# Patient Record
Sex: FEMALE | Race: WHITE | NOT HISPANIC OR LATINO | Employment: UNEMPLOYED | URBAN - METROPOLITAN AREA
[De-identification: names, ages, dates, MRNs, and addresses within clinical notes are randomized per-mention and may not be internally consistent; named-entity substitution may affect disease eponyms.]

---

## 2018-03-13 ENCOUNTER — HOSPITAL ENCOUNTER (OUTPATIENT)
Dept: FAMILY MEDICINE CLINIC | Facility: CLINIC | Age: 28
Setting detail: SPECIMEN
Discharge: HOME | End: 2018-03-13
Attending: HOSPITALIST | Admitting: HOSPITALIST

## 2018-07-27 ENCOUNTER — HOSPITAL ENCOUNTER (OUTPATIENT)
Dept: FAMILY MEDICINE CLINIC | Facility: CLINIC | Age: 28
Setting detail: SPECIMEN
Discharge: HOME | End: 2018-07-27
Attending: HOSPITALIST | Admitting: HOSPITALIST

## 2019-08-25 RX ORDER — AMOXICILLIN AND CLAVULANATE POTASSIUM 875; 125 MG/1; MG/1
1 TABLET, FILM COATED ORAL 2 TIMES DAILY
Qty: 20 TABLET | Refills: 0 | Status: SHIPPED | OUTPATIENT
Start: 2019-08-25 | End: 2019-09-04

## 2023-03-28 ENCOUNTER — TELEPHONE (OUTPATIENT)
Dept: ONCOLOGY | Facility: CLINIC | Age: 33
End: 2023-03-28
Payer: COMMERCIAL

## 2023-04-17 PROBLEM — B00.9 HERPESVIRUS INFECTION: Status: ACTIVE | Noted: 2020-12-24

## 2023-04-17 PROBLEM — F41.9 ANXIETY: Status: ACTIVE | Noted: 2021-09-17

## 2023-04-17 PROBLEM — I82.409 DEEP VENOUS THROMBOSIS: Status: ACTIVE | Noted: 2020-08-13

## 2023-04-17 PROBLEM — J02.9 PHARYNGITIS: Status: ACTIVE | Noted: 2018-03-13

## 2023-04-17 PROBLEM — M19.172 POST-TRAUMATIC ARTHRITIS OF LEFT ANKLE: Status: ACTIVE | Noted: 2019-06-07

## 2023-04-17 PROBLEM — E66.9 OBESITY: Status: ACTIVE | Noted: 2020-12-24

## 2023-04-17 PROBLEM — E03.9 HYPOTHYROIDISM: Status: ACTIVE | Noted: 2018-07-27

## 2023-04-17 PROBLEM — D68.2 FACTOR II DEFICIENCY: Status: ACTIVE | Noted: 2020-12-17

## 2023-04-17 RX ORDER — ADHESIVE TAPE 3"X 2.3 YD
TAPE, NON-MEDICATED TOPICAL
COMMUNITY

## 2023-04-17 RX ORDER — LANOLIN ALCOHOL/MO/W.PET/CERES
CREAM (GRAM) TOPICAL
COMMUNITY

## 2023-04-17 RX ORDER — ENOXAPARIN SODIUM 100 MG/ML
INJECTION SUBCUTANEOUS SEE ADMIN INSTRUCTIONS
COMMUNITY
Start: 2020-10-28

## 2023-04-17 RX ORDER — VALACYCLOVIR HYDROCHLORIDE 500 MG/1
500 TABLET, FILM COATED ORAL DAILY
COMMUNITY

## 2023-04-17 RX ORDER — ONDANSETRON 4 MG/1
1 TABLET, ORALLY DISINTEGRATING ORAL 3 TIMES DAILY
COMMUNITY
Start: 2023-02-03

## 2023-04-17 NOTE — PROGRESS NOTES
Hematology/Oncology Outpatient Consultation    Patient name: Nathalie Panda  : 1990  MRN: [id number]  Primary Care Physician: Suhail Issa MD  Referring Physician: Daisy Bansal, *  Reason For Consult:     Chief Complaint   Patient presents with   • Consult     New Pt, Nimisha, Personal history of other venous thrombosis and embolism - Other diseases of the blood and blood-forming organs and certain disorders involving the immune mechanism complicating pregnancy, first trimeste - Other specified coagulation defects       History of Present Illness:    This is a 34-year-old female who has a history of heterozygote mutation for prothrombin gene as well as heterozygote mutation for factor V Leiden.  In  patient developed right lower extremity deep venous thromboses following surgery.  Reviewed  Review of her labs show that she did have factor V heterozygote mutation identified on her thrombophilia work-up.  She was also found to have a heterozygote mutation for prothrombin gene as well and a positive lupus anticoagulants.  She had anticardiolipin antibodies which were negative Antithrombin III activity was normal and protein C activity was normal at 92%.        She is currently pregnant at 17 weeks gestation.  She is established with maternal-fetal medicine.  She is G2, P1.  With her first pregnancy, patient received prophylactic Lovenox 40 mg twice a day.  She is established with [facility] in [place] and has been recommended to receive Lovenox twice a day which she is actively receiving.    She had preeclampsia with her first pregnancy.  So far this pregnancy has been uneventful.  She denies any bleeding issues    Past Medical History:   Diagnosis Date   • Pregnant        Past Surgical History:   Procedure Laterality Date   • ANKLE SURGERY     • ANKLE SURGERY           Current Outpatient Medications:   •  Enoxaparin Sodium (LOVENOX) 40 MG/0.4ML solution prefilled syringe syringe, Inject  as  directed See Admin Instructions., Disp: , Rfl:   •  Prenatal Vit-Fe Fumarate-FA (PRENATAL 1+1 PO), Take  by mouth., Disp: , Rfl:   •  valACYclovir (VALTREX) 500 MG tablet, Take 1 tablet by mouth Daily., Disp: , Rfl:   •  Calcium 500-2.5 MG-MCG chewable tablet, Calcium 500 (Patient not taking: Reported on 4/19/2023), Disp: , Rfl:   •  Magnesium Oxide 200 MG tablet, magnesium 200 mg (as magnesium oxide) tablet (Patient not taking: Reported on 4/19/2023), Disp: , Rfl:   •  metFORMIN (GLUCOPHAGE) 500 MG tablet, metformin 500 mg tablet (Patient not taking: Reported on 4/19/2023), Disp: , Rfl:   •  Multiple Vitamins-Minerals (MULTIVITAMIN ADULT EXTRA C PO), multivitamin (Patient not taking: Reported on 4/19/2023), Disp: , Rfl:   •  ondansetron ODT (ZOFRAN-ODT) 4 MG disintegrating tablet, Take 1 tablet by mouth 3 (Three) Times a Day. (Patient not taking: Reported on 4/19/2023), Disp: , Rfl:   •  Progesterone 200 MG suppository, , Disp: , Rfl:   •  rivaroxaban (XARELTO) 20 MG tablet, Xarelto 20 mg tablet  TK 1 T PO QD (Patient not taking: Reported on 4/19/2023), Disp: , Rfl:     Allergies   Allergen Reactions   • Aspirin Unknown - Low Severity     Reports she is not to take these due to her h/o Factor II and Factor V   • Nsaids Unknown - Low Severity     Reports she is not to take these due to her h/o Factor II and Factor V       Immunization History   Administered Date(s) Administered   • Hep B, Unspecified 12/24/2020       Family History   Problem Relation Age of Onset   • Skin cancer Maternal Aunt    • Skin cancer Maternal Uncle        Cancer-related family history includes Skin cancer in her maternal aunt and maternal uncle.    Social History     Tobacco Use   • Smoking status: Never   • Smokeless tobacco: Never   Vaping Use   • Vaping Use: Never used   Substance Use Topics   • Alcohol use: Never   • Drug use: Never       ROS:    Review of Systems   Constitutional: Negative for chills, fatigue and fever.   HENT:  "Negative for congestion, drooling, ear discharge, rhinorrhea, sinus pressure and tinnitus.    Eyes: Negative for photophobia, pain and discharge.   Respiratory: Negative for apnea, choking and stridor.    Cardiovascular: Negative for palpitations.   Gastrointestinal: Negative for abdominal distention, abdominal pain and anal bleeding.   Endocrine: Negative for polydipsia and polyphagia.   Genitourinary: Negative for decreased urine volume, flank pain and genital sores.   Musculoskeletal: Negative for gait problem, neck pain and neck stiffness.   Skin: Negative for color change, rash and wound.   Neurological: Negative for tremors, seizures, syncope, facial asymmetry and speech difficulty.   Hematological: Negative for adenopathy.   Psychiatric/Behavioral: Negative for agitation, confusion, hallucinations and self-injury. The patient is not hyperactive.        Objective:    Vitals:    04/19/23 1536   BP: 126/80   Pulse: 88   SpO2: 99%   Weight: 99.6 kg (219 lb 9.6 oz)   Height: 170.2 cm (67\")   PainSc: 0-No pain     Body mass index is 34.39 kg/m².    ECOG  (0) Fully active, able to carry on all predisease performance without restriction    Physical Exam:  Physical Exam  Vitals and nursing note reviewed.   Constitutional:       General: She is not in acute distress.     Appearance: She is not diaphoretic.   HENT:      Head: Normocephalic and atraumatic.   Eyes:      General: No scleral icterus.        Right eye: No discharge.         Left eye: No discharge.      Conjunctiva/sclera: Conjunctivae normal.   Neck:      Thyroid: No thyromegaly.   Cardiovascular:      Rate and Rhythm: Normal rate and regular rhythm.      Heart sounds: Normal heart sounds.     No friction rub. No gallop.   Pulmonary:      Effort: Pulmonary effort is normal. No respiratory distress.      Breath sounds: No stridor. No wheezing.   Abdominal:      General: Bowel sounds are normal.      Palpations: Abdomen is soft. There is no mass.      " Tenderness: There is no abdominal tenderness. There is no guarding or rebound.   Musculoskeletal:         General: No tenderness. Normal range of motion.      Cervical back: Normal range of motion and neck supple.   Lymphadenopathy:      Cervical: No cervical adenopathy.   Skin:     General: Skin is warm.      Findings: No erythema or rash.   Neurological:      Mental Status: She is alert and oriented to person, place, and time.      Motor: No abnormal muscle tone.   Psychiatric:         Behavior: Behavior normal.         RECENT LABS  WBC   Date Value Ref Range Status   04/19/2023 11.48 (H) 3.40 - 10.80 10*3/mm3 Final     RBC   Date Value Ref Range Status   04/19/2023 4.34 3.77 - 5.28 10*6/mm3 Final     Hemoglobin   Date Value Ref Range Status   04/19/2023 13.0 12.0 - 15.9 g/dL Final     Hematocrit   Date Value Ref Range Status   04/19/2023 38.0 34.0 - 46.6 % Final     MCV   Date Value Ref Range Status   04/19/2023 87.6 79.0 - 97.0 fL Final     MCH   Date Value Ref Range Status   04/19/2023 30.0 26.6 - 33.0 pg Final     MCHC   Date Value Ref Range Status   04/19/2023 34.2 31.5 - 35.7 g/dL Final     RDW   Date Value Ref Range Status   04/19/2023 13.8 12.3 - 15.4 % Final     RDW-SD   Date Value Ref Range Status   04/19/2023 43.1 37.0 - 54.0 fl Final     MPV   Date Value Ref Range Status   04/19/2023 12.2 (H) 6.0 - 12.0 fL Final     Platelets   Date Value Ref Range Status   04/19/2023 167 140 - 450 10*3/mm3 Final     Neutrophil %   Date Value Ref Range Status   04/19/2023 67.8 42.7 - 76.0 % Final     Lymphocyte %   Date Value Ref Range Status   04/19/2023 25.8 19.6 - 45.3 % Final     Monocyte %   Date Value Ref Range Status   04/19/2023 5.5 5.0 - 12.0 % Final     Eosinophil %   Date Value Ref Range Status   04/19/2023 0.7 0.3 - 6.2 % Final     Basophil %   Date Value Ref Range Status   04/19/2023 0.2 0.0 - 1.5 % Final     Neutrophils, Absolute   Date Value Ref Range Status   04/19/2023 7.79 (H) 1.70 - 7.00 10*3/mm3  Final     Lymphocytes, Absolute   Date Value Ref Range Status   2023 2.96 0.70 - 3.10 10*3/mm3 Final     Monocytes, Absolute   Date Value Ref Range Status   2023 0.63 0.10 - 0.90 10*3/mm3 Final     Eosinophils, Absolute   Date Value Ref Range Status   2023 0.08 0.00 - 0.40 10*3/mm3 Final     Basophils, Absolute   Date Value Ref Range Status   2023 0.02 0.00 - 0.20 10*3/mm3 Final       No results found for: GLUCOSE, BUN, CREATININE, EGFRIFNONA, EGFRIFAFRI, BCR, K, CO2, CALCIUM, PROTENTOTREF, ALBUMIN, LABIL2, BILIRUBIN, AST, ALT      Assessment & Plan   Deep vein thrombosis (DVT) of non-extremity vein, unspecified chronicity  - CBC & Differential      1. IUP at 17 weeks  2.   3. Prothrombin gene heterozygous mutation  4. Factor V leidin heterozygous mutation  5. Positive lupus anticoagulant      Plans:    · Continue Lovenox 40 mg SQ every 12  · Patient to start baby aspirin 81 mg p.o. daily per maternal  fetal medicine recommendation  · Follow-up 6 weeks  · All questions answered    Patient verbalized understanding and is in agreement of the above plan.          I spent 45 total minutes, face-to-face, caring for Nathalie today.  90% of this time involved counseling and/or coordination of care as documented within this note.

## 2023-04-19 ENCOUNTER — CONSULT (OUTPATIENT)
Dept: ONCOLOGY | Facility: CLINIC | Age: 33
End: 2023-04-19
Payer: COMMERCIAL

## 2023-04-19 ENCOUNTER — LAB (OUTPATIENT)
Dept: LAB | Facility: HOSPITAL | Age: 33
End: 2023-04-19
Payer: COMMERCIAL

## 2023-04-19 VITALS
WEIGHT: 219.6 LBS | SYSTOLIC BLOOD PRESSURE: 126 MMHG | BODY MASS INDEX: 34.47 KG/M2 | HEART RATE: 88 BPM | DIASTOLIC BLOOD PRESSURE: 80 MMHG | HEIGHT: 67 IN | OXYGEN SATURATION: 99 %

## 2023-04-19 DIAGNOSIS — I82.90 DEEP VEIN THROMBOSIS (DVT) OF NON-EXTREMITY VEIN, UNSPECIFIED CHRONICITY: Primary | ICD-10-CM

## 2023-04-19 DIAGNOSIS — I82.90 DEEP VEIN THROMBOSIS (DVT) OF NON-EXTREMITY VEIN, UNSPECIFIED CHRONICITY: ICD-10-CM

## 2023-04-19 LAB
BASOPHILS # BLD AUTO: 0.02 10*3/MM3 (ref 0–0.2)
BASOPHILS NFR BLD AUTO: 0.2 % (ref 0–1.5)
DEPRECATED RDW RBC AUTO: 43.1 FL (ref 37–54)
EOSINOPHIL # BLD AUTO: 0.08 10*3/MM3 (ref 0–0.4)
EOSINOPHIL NFR BLD AUTO: 0.7 % (ref 0.3–6.2)
ERYTHROCYTE [DISTWIDTH] IN BLOOD BY AUTOMATED COUNT: 13.8 % (ref 12.3–15.4)
HCT VFR BLD AUTO: 38 % (ref 34–46.6)
HGB BLD-MCNC: 13 G/DL (ref 12–15.9)
LYMPHOCYTES # BLD AUTO: 2.96 10*3/MM3 (ref 0.7–3.1)
LYMPHOCYTES NFR BLD AUTO: 25.8 % (ref 19.6–45.3)
MCH RBC QN AUTO: 30 PG (ref 26.6–33)
MCHC RBC AUTO-ENTMCNC: 34.2 G/DL (ref 31.5–35.7)
MCV RBC AUTO: 87.6 FL (ref 79–97)
MONOCYTES # BLD AUTO: 0.63 10*3/MM3 (ref 0.1–0.9)
MONOCYTES NFR BLD AUTO: 5.5 % (ref 5–12)
NEUTROPHILS NFR BLD AUTO: 67.8 % (ref 42.7–76)
NEUTROPHILS NFR BLD AUTO: 7.79 10*3/MM3 (ref 1.7–7)
PLATELET # BLD AUTO: 167 10*3/MM3 (ref 140–450)
PMV BLD AUTO: 12.2 FL (ref 6–12)
RBC # BLD AUTO: 4.34 10*6/MM3 (ref 3.77–5.28)
WBC NRBC COR # BLD: 11.48 10*3/MM3 (ref 3.4–10.8)

## 2023-04-19 PROCEDURE — 85025 COMPLETE CBC W/AUTO DIFF WBC: CPT

## 2023-04-21 ENCOUNTER — PATIENT ROUNDING (BHMG ONLY) (OUTPATIENT)
Dept: ONCOLOGY | Facility: CLINIC | Age: 33
End: 2023-04-21
Payer: COMMERCIAL

## 2023-04-21 NOTE — PROGRESS NOTES
April 21, 2023    Hello, may I speak with Nathalie Panda?    My name is Aditi Allan      I am  with [facility] [address] [place] [address] [phone].    Before we get started may I verify your date of birth? 1990    I am calling to officially welcome you to our practice and ask about your recent visit. Is this a good time to talk? no    Tell me about your visit with us. What things went well?  A My Chart message was sent to the patient.       We're always looking for ways to make our patients' experiences even better. Do you have recommendations on ways we may improve?  no    Overall were you satisfied with your first visit to our practice? yes       I appreciate you taking the time to speak with me today. Is there anything else I can do for you? no      Thank you, and have a great day.

## 2023-05-22 ENCOUNTER — TELEPHONE (OUTPATIENT)
Dept: ENDOCRINOLOGY | Facility: CLINIC | Age: 33
End: 2023-05-22

## 2023-05-22 NOTE — TELEPHONE ENCOUNTER
Caller: Nathalie Panda    Relationship to patient: Self    Best call back number: [phone]  CAN CALL IN THE MORNINGS OR AFTER 2PM.     Patient is needing: PATIENT OBGYN SENT REFERRAL   DR. HANDY [phone]    NEED CALL BACK TO CHECK ON STATUS

## 2023-05-30 NOTE — PROGRESS NOTES
Hematology/Oncology Outpatient Follow Up    PATIENT NAME:Nathalie Panda  :1990  MRN: [id number]  PRIMARY CARE PHYSICIAN: Suhail Issa MD  REFERRING PHYSICIAN: No ref. provider found    Chief Complaint   Patient presents with   • Follow-up     Deep vein thrombosis (DVT) of non-extremity vein        HISTORY OF PRESENT ILLNESS:     This is a 34-year-old female who has a history of heterozygote mutation for prothrombin gene as well as heterozygote mutation for factor V Leiden.  In  patient developed right lower extremity deep venous thromboses following surgery.  Reviewed  Review of her labs show that she did have factor V heterozygote mutation identified on her thrombophilia work-up.  She was also found to have a heterozygote mutation for prothrombin gene as well and a positive lupus anticoagulants.  She had anticardiolipin antibodies which were negative Antithrombin III activity was normal and protein C activity was normal at 92%.          She is currently pregnant at 17 weeks gestation.  She is established with maternal-fetal medicine.  She is G2, P1.  With her first pregnancy, patient received prophylactic Lovenox 40 mg twice a day.  She is established with [facility] in [place] and has been recommended to receive Lovenox twice a day which she is actively receiving.     She had preeclampsia with her first pregnancy.  So far this pregnancy has been uneventful.  She denies any bleeding issues  Past Medical History:   Diagnosis Date   • Pregnant        Past Surgical History:   Procedure Laterality Date   • ANKLE SURGERY     • ANKLE SURGERY           Current Outpatient Medications:   •  aspirin 81 MG EC tablet, Take 1 tablet by mouth Daily., Disp: , Rfl:   •  Enoxaparin Sodium (LOVENOX) 40 MG/0.4ML solution prefilled syringe syringe, Inject  as directed See Admin Instructions., Disp: , Rfl:   •  Prenatal Vit-Fe Fumarate-FA (PRENATAL 1+1 PO), Take  by mouth., Disp: , Rfl:   •  valACYclovir (VALTREX)  "500 MG tablet, Take 1 tablet by mouth Daily., Disp: , Rfl:     Allergies   Allergen Reactions   • Aspirin Unknown - Low Severity     Reports she is not to take these due to her h/o Factor II and Factor V   • Nsaids Unknown - Low Severity     Reports she is not to take these due to her h/o Factor II and Factor V       Family History   Problem Relation Age of Onset   • Skin cancer Maternal Aunt    • Skin cancer Maternal Uncle        Cancer-related family history includes Skin cancer in her maternal aunt and maternal uncle.    Social History     Tobacco Use   • Smoking status: Never   • Smokeless tobacco: Never   Vaping Use   • Vaping Use: Never used   Substance Use Topics   • Alcohol use: Never   • Drug use: Never       I have reviewed and confirmed the accuracy of the patient's history: Chief complaint, HPI, ROS and Subjective as entered by the MA/LPN/RN. Tiff De Dios MD 05/31/23     SUBJECTIVE:      Bruising at sites of injection    REVIEW OF SYSTEMS:  Review of Systems  Abdominal wall bruises  OBJECTIVE:    Vitals:    05/31/23 1139   BP: 116/81   Pulse: 88   Resp: 20   Temp: 98 °F (36.7 °C)   TempSrc: Infrared   SpO2: 99%   Weight: 102 kg (224 lb)   Height: 170.2 cm (67\")   PainSc: 0-No pain     Body mass index is 35.08 kg/m².    ECOG  (0) Fully active, able to carry on all predisease performance without restriction    Physical Exam  Remarkable except for abdominal wall bruises, pregnant uterus    RECENT LABS  WBC   Date Value Ref Range Status   05/31/2023 11.17 (H) 3.40 - 10.80 10*3/mm3 Final     RBC   Date Value Ref Range Status   05/31/2023 4.04 3.77 - 5.28 10*6/mm3 Final     Hemoglobin   Date Value Ref Range Status   05/31/2023 12.2 12.0 - 15.9 g/dL Final     Hematocrit   Date Value Ref Range Status   05/31/2023 36.2 34.0 - 46.6 % Final     MCV   Date Value Ref Range Status   05/31/2023 89.6 79.0 - 97.0 fL Final     MCH   Date Value Ref Range Status   05/31/2023 30.2 26.6 - 33.0 pg Final     MCHC "   Date Value Ref Range Status   2023 33.7 31.5 - 35.7 g/dL Final     RDW   Date Value Ref Range Status   2023 13.5 12.3 - 15.4 % Final     RDW-SD   Date Value Ref Range Status   2023 42.9 37.0 - 54.0 fl Final     MPV   Date Value Ref Range Status   2023 11.3 6.0 - 12.0 fL Final     Platelets   Date Value Ref Range Status   2023 192 140 - 450 10*3/mm3 Final     Neutrophil %   Date Value Ref Range Status   2023 80.2 (H) 42.7 - 76.0 % Final     Lymphocyte %   Date Value Ref Range Status   2023 14.7 (L) 19.6 - 45.3 % Final     Monocyte %   Date Value Ref Range Status   2023 4.4 (L) 5.0 - 12.0 % Final     Eosinophil %   Date Value Ref Range Status   2023 0.5 0.3 - 6.2 % Final     Basophil %   Date Value Ref Range Status   2023 0.2 0.0 - 1.5 % Final     Neutrophils, Absolute   Date Value Ref Range Status   2023 8.96 (H) 1.70 - 7.00 10*3/mm3 Final     Lymphocytes, Absolute   Date Value Ref Range Status   2023 1.64 0.70 - 3.10 10*3/mm3 Final     Monocytes, Absolute   Date Value Ref Range Status   2023 0.49 0.10 - 0.90 10*3/mm3 Final     Eosinophils, Absolute   Date Value Ref Range Status   2023 0.06 0.00 - 0.40 10*3/mm3 Final     Basophils, Absolute   Date Value Ref Range Status   2023 0.02 0.00 - 0.20 10*3/mm3 Final       No results found for: GLUCOSE, BUN, CREATININE, EGFRIFNONA, EGFRIFAFRI, BCR, K, CO2, CALCIUM, PROTENTOTREF, ALBUMIN, LABIL2, BILIRUBIN, AST, ALT      Assessment & Plan     Deep vein thrombosis (DVT) of non-extremity vein, unspecified chronicity  - CBC & Differential      ASSESSMENT:      Deep vein thrombosis (DVT) of non-extremity vein, unspecified chronicity  - CBC & Differential        1. IUP at 23 weeks  2.   3. Abdominal wall bruising secondary to Lovenox.  Patient is currently on 40 mg every 12  4. Prothrombin gene heterozygous mutation  5. Factor V leidin heterozygous mutation  6. Positive lupus  anticoagulant  7. Assessment has been reviewed and updated        Plans:     • Continue Lovenox 40 mg SQ every 12  • Patient on aspirin 81 mg p.o. daily per maternal  fetal medicine recommendation likely contributing to her bruises.  She will follow-up with OB  • Follow-up 4 weeks  • All questions answered     Patient verbalized understanding and is in agreement of the above plan.              I spent 30 total minutes, face-to-face, caring for Nathalie today. 90% of this time involved counseling and/or coordination of care as documented within this note.

## 2023-05-31 ENCOUNTER — OFFICE VISIT (OUTPATIENT)
Dept: ONCOLOGY | Facility: CLINIC | Age: 33
End: 2023-05-31

## 2023-05-31 ENCOUNTER — LAB (OUTPATIENT)
Dept: LAB | Facility: HOSPITAL | Age: 33
End: 2023-05-31

## 2023-05-31 VITALS
DIASTOLIC BLOOD PRESSURE: 81 MMHG | HEIGHT: 67 IN | WEIGHT: 224 LBS | BODY MASS INDEX: 35.16 KG/M2 | HEART RATE: 88 BPM | OXYGEN SATURATION: 99 % | SYSTOLIC BLOOD PRESSURE: 116 MMHG | RESPIRATION RATE: 20 BRPM | TEMPERATURE: 98 F

## 2023-05-31 DIAGNOSIS — I82.90 DEEP VEIN THROMBOSIS (DVT) OF NON-EXTREMITY VEIN, UNSPECIFIED CHRONICITY: Primary | ICD-10-CM

## 2023-05-31 LAB
BASOPHILS # BLD AUTO: 0.02 10*3/MM3 (ref 0–0.2)
BASOPHILS NFR BLD AUTO: 0.2 % (ref 0–1.5)
DEPRECATED RDW RBC AUTO: 42.9 FL (ref 37–54)
EOSINOPHIL # BLD AUTO: 0.06 10*3/MM3 (ref 0–0.4)
EOSINOPHIL NFR BLD AUTO: 0.5 % (ref 0.3–6.2)
ERYTHROCYTE [DISTWIDTH] IN BLOOD BY AUTOMATED COUNT: 13.5 % (ref 12.3–15.4)
HCT VFR BLD AUTO: 36.2 % (ref 34–46.6)
HGB BLD-MCNC: 12.2 G/DL (ref 12–15.9)
HOLD SPECIMEN: NORMAL
HOLD SPECIMEN: NORMAL
LYMPHOCYTES # BLD AUTO: 1.64 10*3/MM3 (ref 0.7–3.1)
LYMPHOCYTES NFR BLD AUTO: 14.7 % (ref 19.6–45.3)
MCH RBC QN AUTO: 30.2 PG (ref 26.6–33)
MCHC RBC AUTO-ENTMCNC: 33.7 G/DL (ref 31.5–35.7)
MCV RBC AUTO: 89.6 FL (ref 79–97)
MONOCYTES # BLD AUTO: 0.49 10*3/MM3 (ref 0.1–0.9)
MONOCYTES NFR BLD AUTO: 4.4 % (ref 5–12)
NEUTROPHILS NFR BLD AUTO: 8.96 10*3/MM3 (ref 1.7–7)
NEUTROPHILS NFR BLD AUTO: 80.2 % (ref 42.7–76)
PLATELET # BLD AUTO: 192 10*3/MM3 (ref 140–450)
PMV BLD AUTO: 11.3 FL (ref 6–12)
RBC # BLD AUTO: 4.04 10*6/MM3 (ref 3.77–5.28)
WBC NRBC COR # BLD: 11.17 10*3/MM3 (ref 3.4–10.8)
WHOLE BLOOD HOLD COAG: NORMAL

## 2023-05-31 PROCEDURE — 36415 COLL VENOUS BLD VENIPUNCTURE: CPT

## 2023-05-31 PROCEDURE — 85025 COMPLETE CBC W/AUTO DIFF WBC: CPT

## 2023-05-31 RX ORDER — ASPIRIN 81 MG/1
81 TABLET ORAL DAILY
COMMUNITY

## 2023-07-25 NOTE — PROGRESS NOTES
Hematology/Oncology Outpatient Follow Up    PATIENT NAME:Nathalie Panda  :1990  MRN: [id number]  PRIMARY CARE PHYSICIAN: Suhail Issa MD  REFERRING PHYSICIAN: No ref. provider found    Chief Complaint   Patient presents with    Follow-up     Deep vein thrombosis (DVT) of non-extremity vein, unspecified chronicity        HISTORY OF PRESENT ILLNESS:     This is a 33-year-old female who has a history of heterozygote mutation for prothrombin gene as well as heterozygote mutation for factor V Leiden.  In  patient developed right lower extremity deep venous thromboses following surgery.  Reviewed  Review of her labs show that she did have factor V heterozygote mutation identified on her thrombophilia work-up.  She was also found to have a heterozygote mutation for prothrombin gene as well and a positive lupus anticoagulants.  She had anticardiolipin antibodies which were negative Antithrombin III activity was normal and protein C activity was normal at 92%.          She is currently pregnant at 17 weeks gestation.  She is established with maternal-fetal medicine.  She is G2, P1.  With her first pregnancy, patient received prophylactic Lovenox 40 mg twice a day.  She is established with [facility] in [place] and has been recommended to receive Lovenox twice a day which she is actively receiving.     She had preeclampsia with her first pregnancy.  So far this pregnancy has been uneventful.  She denies any bleeding issues  Past Medical History:   Diagnosis Date    Pregnant        Past Surgical History:   Procedure Laterality Date    ANKLE SURGERY      ANKLE SURGERY           Current Outpatient Medications:     Blood Glucose Monitoring Suppl (OneTouch Verio Reflect) w/Device kit, See Admin Instructions., Disp: , Rfl:     Lancets (OneTouch Delica Plus [id number]) misc, 4 (Four) Times a Day. as directed, Disp: , Rfl:     OneTouch Verio test strip, by Other route 4 (Four) Times a Day. Use to test blood sugar  "4 times daily, Disp: , Rfl:     aspirin 81 MG EC tablet, Take 1 tablet by mouth Daily., Disp: , Rfl:     Enoxaparin Sodium (LOVENOX) 40 MG/0.4ML solution prefilled syringe syringe, Inject  as directed See Admin Instructions., Disp: , Rfl:     Prenatal Vit-Fe Fumarate-FA (PRENATAL 1+1 PO), Take  by mouth., Disp: , Rfl:     valACYclovir (VALTREX) 500 MG tablet, Take 1 tablet by mouth Daily., Disp: , Rfl:     Allergies   Allergen Reactions    Aspirin Unknown - Low Severity     Reports she is not to take these due to her h/o Factor II and Factor V    Nsaids Unknown - Low Severity     Reports she is not to take these due to her h/o Factor II and Factor V       Family History   Problem Relation Age of Onset    Skin cancer Maternal Aunt     Skin cancer Maternal Uncle        Cancer-related family history includes Skin cancer in her maternal aunt and maternal uncle.    Social History     Tobacco Use    Smoking status: Never    Smokeless tobacco: Never   Vaping Use    Vaping Use: Never used   Substance Use Topics    Alcohol use: Never    Drug use: Never     I have reviewed and confirmed the accuracy of the patient's history: Chief complaint, HPI, ROS, and Subjective as entered by the MA/LPN/RN. Tiff De Dios MD 07/26/23      SUBJECTIVE:      Bruising at sites of injection    Patient denies any new issues    REVIEW OF SYSTEMS:  Review of Systems  Abdominal wall bruises  OBJECTIVE:    Vitals:    07/26/23 1044   BP: 135/84   Pulse: 104   Resp: 20   Temp: 98 °F (36.7 °C)   TempSrc: Infrared   SpO2: 99%   Weight: 101 kg (223 lb)   Height: 170.2 cm (67\")   PainSc:   1   PainLoc: Generalized     Body mass index is 34.93 kg/m².    ECOG  (0) Fully active, able to carry on all predisease performance without restriction    Physical Exam  Remarkable except for abdominal wall bruises, pregnant uterus  I have reexamined the patient and the results are consistent with the previously documented exam. Tiff De Dios MD  "   RECENT LABS  WBC   Date Value Ref Range Status   07/26/2023 9.01 3.40 - 10.80 10*3/mm3 Final     RBC   Date Value Ref Range Status   07/26/2023 3.94 3.77 - 5.28 10*6/mm3 Final     Hemoglobin   Date Value Ref Range Status   07/26/2023 11.6 (L) 12.0 - 15.9 g/dL Final     Hematocrit   Date Value Ref Range Status   07/26/2023 35.1 34.0 - 46.6 % Final     MCV   Date Value Ref Range Status   07/26/2023 89.1 79.0 - 97.0 fL Final     MCH   Date Value Ref Range Status   07/26/2023 29.4 26.6 - 33.0 pg Final     MCHC   Date Value Ref Range Status   07/26/2023 33.0 31.5 - 35.7 g/dL Final     RDW   Date Value Ref Range Status   07/26/2023 14.1 12.3 - 15.4 % Final     RDW-SD   Date Value Ref Range Status   07/26/2023 44.5 37.0 - 54.0 fl Final     MPV   Date Value Ref Range Status   07/26/2023 11.4 6.0 - 12.0 fL Final     Platelets   Date Value Ref Range Status   07/26/2023 150 140 - 450 10*3/mm3 Final     Neutrophil %   Date Value Ref Range Status   07/26/2023 73.9 42.7 - 76.0 % Final     Lymphocyte %   Date Value Ref Range Status   07/26/2023 20.6 19.6 - 45.3 % Final     Monocyte %   Date Value Ref Range Status   07/26/2023 4.6 (L) 5.0 - 12.0 % Final     Eosinophil %   Date Value Ref Range Status   07/26/2023 0.7 0.3 - 6.2 % Final     Basophil %   Date Value Ref Range Status   07/26/2023 0.2 0.0 - 1.5 % Final     Neutrophils, Absolute   Date Value Ref Range Status   07/26/2023 6.66 1.70 - 7.00 10*3/mm3 Final     Lymphocytes, Absolute   Date Value Ref Range Status   07/26/2023 1.86 0.70 - 3.10 10*3/mm3 Final     Monocytes, Absolute   Date Value Ref Range Status   07/26/2023 0.41 0.10 - 0.90 10*3/mm3 Final     Eosinophils, Absolute   Date Value Ref Range Status   07/26/2023 0.06 0.00 - 0.40 10*3/mm3 Final     Basophils, Absolute   Date Value Ref Range Status   07/26/2023 0.02 0.00 - 0.20 10*3/mm3 Final       No results found for: GLUCOSE, BUN, CREATININE, EGFRIFNONA, EGFRIFAFRI, BCR, K, CO2, CALCIUM, PROTENTOTREF, ALBUMIN,  LABIL2, BILIRUBIN, AST, ALT      Assessment & Plan     Deep vein thrombosis (DVT) of non-extremity vein, unspecified chronicity  - CBC & Differential      ASSESSMENT:      Deep vein thrombosis (DVT) of non-extremity vein, unspecified chronicity  - CBC & Differential        IUP at 31 weeks    Mild anemia likely secondary to iron deficiency.  She will continue prenatal vitamins with iron.  Repeat CBC in 2 weeks  Abdominal wall bruising secondary to Lovenox.  Patient is currently on 40 mg every 12.  We will continue the same  Prothrombin gene heterozygous mutation  Factor V leidin heterozygous mutation  Positive lupus anticoagulant  Assessment has been reviewed and updated        Plans:     Continue Lovenox 40 mg SQ every 12.  Reviewed  Patient on aspirin 81 mg p.o. daily per maternal  fetal medicine recommendation likely contributing to her bruises.  She will follow-up with OB.  She will continue  CBC in 2 weeks  Follow-up in 4 weeks  Continue prenatal vitamins with iron  All questions answered     Patient verbalized understanding and is in agreement of the above plan.         I spent 30 total minutes, face-to-face, caring for Nathalie today. 90% of this time involved counseling and/or coordination of care as documented within this note.

## 2023-07-26 ENCOUNTER — LAB (OUTPATIENT)
Dept: LAB | Facility: HOSPITAL | Age: 33
End: 2023-07-26
Payer: COMMERCIAL

## 2023-07-26 ENCOUNTER — OFFICE VISIT (OUTPATIENT)
Dept: ONCOLOGY | Facility: CLINIC | Age: 33
End: 2023-07-26
Payer: COMMERCIAL

## 2023-07-26 VITALS
HEIGHT: 67 IN | HEART RATE: 104 BPM | SYSTOLIC BLOOD PRESSURE: 135 MMHG | TEMPERATURE: 98 F | DIASTOLIC BLOOD PRESSURE: 84 MMHG | RESPIRATION RATE: 20 BRPM | WEIGHT: 223 LBS | BODY MASS INDEX: 35 KG/M2 | OXYGEN SATURATION: 99 %

## 2023-07-26 DIAGNOSIS — I82.90 DEEP VEIN THROMBOSIS (DVT) OF NON-EXTREMITY VEIN, UNSPECIFIED CHRONICITY: Primary | ICD-10-CM

## 2023-07-26 LAB
BASOPHILS # BLD AUTO: 0.02 10*3/MM3 (ref 0–0.2)
BASOPHILS NFR BLD AUTO: 0.2 % (ref 0–1.5)
DEPRECATED RDW RBC AUTO: 44.5 FL (ref 37–54)
EOSINOPHIL # BLD AUTO: 0.06 10*3/MM3 (ref 0–0.4)
EOSINOPHIL NFR BLD AUTO: 0.7 % (ref 0.3–6.2)
ERYTHROCYTE [DISTWIDTH] IN BLOOD BY AUTOMATED COUNT: 14.1 % (ref 12.3–15.4)
HCT VFR BLD AUTO: 35.1 % (ref 34–46.6)
HGB BLD-MCNC: 11.6 G/DL (ref 12–15.9)
HOLD SPECIMEN: NORMAL
HOLD SPECIMEN: NORMAL
LYMPHOCYTES # BLD AUTO: 1.86 10*3/MM3 (ref 0.7–3.1)
LYMPHOCYTES NFR BLD AUTO: 20.6 % (ref 19.6–45.3)
MCH RBC QN AUTO: 29.4 PG (ref 26.6–33)
MCHC RBC AUTO-ENTMCNC: 33 G/DL (ref 31.5–35.7)
MCV RBC AUTO: 89.1 FL (ref 79–97)
MONOCYTES # BLD AUTO: 0.41 10*3/MM3 (ref 0.1–0.9)
MONOCYTES NFR BLD AUTO: 4.6 % (ref 5–12)
NEUTROPHILS NFR BLD AUTO: 6.66 10*3/MM3 (ref 1.7–7)
NEUTROPHILS NFR BLD AUTO: 73.9 % (ref 42.7–76)
PLATELET # BLD AUTO: 150 10*3/MM3 (ref 140–450)
PMV BLD AUTO: 11.4 FL (ref 6–12)
RBC # BLD AUTO: 3.94 10*6/MM3 (ref 3.77–5.28)
WBC NRBC COR # BLD: 9.01 10*3/MM3 (ref 3.4–10.8)
WHOLE BLOOD HOLD COAG: NORMAL

## 2023-07-26 PROCEDURE — 36415 COLL VENOUS BLD VENIPUNCTURE: CPT

## 2023-07-26 PROCEDURE — 85025 COMPLETE CBC W/AUTO DIFF WBC: CPT

## 2023-07-26 RX ORDER — LANCETS 33 GAUGE
EACH MISCELLANEOUS 4 TIMES DAILY
COMMUNITY
Start: 2023-07-20

## 2023-07-26 RX ORDER — BLOOD-GLUCOSE METER
KIT MISCELLANEOUS SEE ADMIN INSTRUCTIONS
COMMUNITY
Start: 2023-06-19

## 2023-07-26 RX ORDER — BLOOD SUGAR DIAGNOSTIC
STRIP MISCELLANEOUS 4 TIMES DAILY
COMMUNITY
Start: 2023-07-20

## 2023-08-03 ENCOUNTER — TELEPHONE (OUTPATIENT)
Dept: ONCOLOGY | Facility: CLINIC | Age: 33
End: 2023-08-03

## 2023-08-03 NOTE — TELEPHONE ENCOUNTER
Caller:  FROM [facility]    Best call back number: [phone]     IS CALLING TO GET PROGRESS NTOE S FROM 5-31-23 AND 7-26-23. FAX -[phone].

## 2023-08-08 ENCOUNTER — LAB (OUTPATIENT)
Dept: LAB | Facility: HOSPITAL | Age: 33
End: 2023-08-08
Payer: COMMERCIAL

## 2023-08-08 DIAGNOSIS — I82.90 DEEP VEIN THROMBOSIS (DVT) OF NON-EXTREMITY VEIN, UNSPECIFIED CHRONICITY: ICD-10-CM

## 2023-08-08 LAB
BASOPHILS # BLD AUTO: 0.02 10*3/MM3 (ref 0–0.2)
BASOPHILS NFR BLD AUTO: 0.2 % (ref 0–1.5)
DEPRECATED RDW RBC AUTO: 44.8 FL (ref 37–54)
EOSINOPHIL # BLD AUTO: 0.04 10*3/MM3 (ref 0–0.4)
EOSINOPHIL NFR BLD AUTO: 0.4 % (ref 0.3–6.2)
ERYTHROCYTE [DISTWIDTH] IN BLOOD BY AUTOMATED COUNT: 14.1 % (ref 12.3–15.4)
HCT VFR BLD AUTO: 34.5 % (ref 34–46.6)
HGB BLD-MCNC: 11.3 G/DL (ref 12–15.9)
LYMPHOCYTES # BLD AUTO: 2.17 10*3/MM3 (ref 0.7–3.1)
LYMPHOCYTES NFR BLD AUTO: 21.7 % (ref 19.6–45.3)
MCH RBC QN AUTO: 29.3 PG (ref 26.6–33)
MCHC RBC AUTO-ENTMCNC: 32.8 G/DL (ref 31.5–35.7)
MCV RBC AUTO: 89.4 FL (ref 79–97)
MONOCYTES # BLD AUTO: 0.51 10*3/MM3 (ref 0.1–0.9)
MONOCYTES NFR BLD AUTO: 5.1 % (ref 5–12)
NEUTROPHILS NFR BLD AUTO: 7.27 10*3/MM3 (ref 1.7–7)
NEUTROPHILS NFR BLD AUTO: 72.6 % (ref 42.7–76)
PLATELET # BLD AUTO: 157 10*3/MM3 (ref 140–450)
PMV BLD AUTO: 10.9 FL (ref 6–12)
RBC # BLD AUTO: 3.86 10*6/MM3 (ref 3.77–5.28)
WBC NRBC COR # BLD: 10.01 10*3/MM3 (ref 3.4–10.8)

## 2023-08-08 PROCEDURE — 85025 COMPLETE CBC W/AUTO DIFF WBC: CPT

## 2023-08-08 PROCEDURE — 36415 COLL VENOUS BLD VENIPUNCTURE: CPT

## 2023-08-23 ENCOUNTER — LAB (OUTPATIENT)
Dept: LAB | Facility: HOSPITAL | Age: 33
End: 2023-08-23
Payer: COMMERCIAL

## 2023-08-23 ENCOUNTER — OFFICE VISIT (OUTPATIENT)
Dept: ONCOLOGY | Facility: CLINIC | Age: 33
End: 2023-08-23
Payer: COMMERCIAL

## 2023-08-23 VITALS
RESPIRATION RATE: 18 BRPM | DIASTOLIC BLOOD PRESSURE: 78 MMHG | WEIGHT: 221 LBS | OXYGEN SATURATION: 98 % | HEIGHT: 67 IN | BODY MASS INDEX: 34.69 KG/M2 | HEART RATE: 60 BPM | TEMPERATURE: 98 F | SYSTOLIC BLOOD PRESSURE: 116 MMHG

## 2023-08-23 DIAGNOSIS — I82.90 DEEP VEIN THROMBOSIS (DVT) OF NON-EXTREMITY VEIN, UNSPECIFIED CHRONICITY: Primary | ICD-10-CM

## 2023-08-23 LAB
BASOPHILS # BLD AUTO: 0.01 10*3/MM3 (ref 0–0.2)
BASOPHILS NFR BLD AUTO: 0.1 % (ref 0–1.5)
DEPRECATED RDW RBC AUTO: 45.6 FL (ref 37–54)
EOSINOPHIL # BLD AUTO: 0.04 10*3/MM3 (ref 0–0.4)
EOSINOPHIL NFR BLD AUTO: 0.3 % (ref 0.3–6.2)
ERYTHROCYTE [DISTWIDTH] IN BLOOD BY AUTOMATED COUNT: 14.2 % (ref 12.3–15.4)
HCT VFR BLD AUTO: 34.2 % (ref 34–46.6)
HGB BLD-MCNC: 11.3 G/DL (ref 12–15.9)
HOLD SPECIMEN: NORMAL
HOLD SPECIMEN: NORMAL
LYMPHOCYTES # BLD AUTO: 2.31 10*3/MM3 (ref 0.7–3.1)
LYMPHOCYTES NFR BLD AUTO: 19.9 % (ref 19.6–45.3)
MCH RBC QN AUTO: 29.4 PG (ref 26.6–33)
MCHC RBC AUTO-ENTMCNC: 33 G/DL (ref 31.5–35.7)
MCV RBC AUTO: 89.1 FL (ref 79–97)
MONOCYTES # BLD AUTO: 0.62 10*3/MM3 (ref 0.1–0.9)
MONOCYTES NFR BLD AUTO: 5.3 % (ref 5–12)
NEUTROPHILS NFR BLD AUTO: 74.4 % (ref 42.7–76)
NEUTROPHILS NFR BLD AUTO: 8.62 10*3/MM3 (ref 1.7–7)
PLATELET # BLD AUTO: 152 10*3/MM3 (ref 140–450)
PMV BLD AUTO: 11.7 FL (ref 6–12)
RBC # BLD AUTO: 3.84 10*6/MM3 (ref 3.77–5.28)
WBC NRBC COR # BLD: 11.6 10*3/MM3 (ref 3.4–10.8)
WHOLE BLOOD HOLD COAG: NORMAL

## 2023-08-23 PROCEDURE — 36415 COLL VENOUS BLD VENIPUNCTURE: CPT

## 2023-08-23 PROCEDURE — 85025 COMPLETE CBC W/AUTO DIFF WBC: CPT

## 2023-08-23 RX ORDER — INSULIN GLARGINE 100 [IU]/ML
INJECTION, SOLUTION SUBCUTANEOUS
COMMUNITY
Start: 2023-08-03

## 2023-08-23 RX ORDER — ADHESIVE TAPE 3"X 2.3 YD
TAPE, NON-MEDICATED TOPICAL
COMMUNITY
End: 2023-08-23

## 2023-08-23 RX ORDER — FAMOTIDINE 20 MG/1
TABLET, FILM COATED ORAL
COMMUNITY
Start: 2023-08-01

## 2023-08-23 NOTE — PROGRESS NOTES
Hematology/Oncology Outpatient Follow Up    PATIENT NAME:Nathalie Panda  :1990  MRN: [id number]  PRIMARY CARE PHYSICIAN: Suhail Issa MD  REFERRING PHYSICIAN: No ref. provider found    Chief Complaint   Patient presents with    Follow-up     Deep vein thrombosis (DVT) of non-extremity vein, unspecified chronicity        HISTORY OF PRESENT ILLNESS:     This is a 33-year-old female who has a history of heterozygote mutation for prothrombin gene as well as heterozygote mutation for factor V Leiden.  In  patient developed right lower extremity deep venous thromboses following surgery.  Reviewed  Review of her labs show that she did have factor V heterozygote mutation identified on her thrombophilia work-up.  She was also found to have a heterozygote mutation for prothrombin gene as well and a positive lupus anticoagulants.  She had anticardiolipin antibodies which were negative Antithrombin III activity was normal and protein C activity was normal at 92%.          She is currently pregnant at 17 weeks gestation.  She is established with maternal-fetal medicine.  She is G2, P1.  With her first pregnancy, patient received prophylactic Lovenox 40 mg twice a day.  She is established with [facility] in [place] and has been recommended to receive Lovenox twice a day which she is actively receiving.     She had preeclampsia with her first pregnancy.  So far this pregnancy has been uneventful.  She denies any bleeding issues  Past Medical History:   Diagnosis Date    Pregnant        Past Surgical History:   Procedure Laterality Date    ANKLE SURGERY      ANKLE SURGERY           Current Outpatient Medications:     famotidine (Pepcid AC Maximum Strength) 20 MG tablet, , Disp: , Rfl:     Insulin Glargine (BASAGLAR KWIKPEN) 100 UNIT/ML injection pen, INJECT TEN UNITS INTO THE SKIN DAILY, Disp: , Rfl:     aspirin 81 MG EC tablet, Take 1 tablet by mouth Daily., Disp: , Rfl:     Blood Glucose Monitoring Suppl  "(OneTouch Verio Reflect) w/Device kit, See Admin Instructions., Disp: , Rfl:     Enoxaparin Sodium (LOVENOX) 40 MG/0.4ML solution prefilled syringe syringe, Inject  as directed See Admin Instructions., Disp: , Rfl:     Lancets (OneTouch Delica Plus [id number]) misc, 4 (Four) Times a Day. as directed, Disp: , Rfl:     OneTouch Verio test strip, by Other route 4 (Four) Times a Day. Use to test blood sugar 4 times daily, Disp: , Rfl:     Prenatal Vit-Fe Fumarate-FA (PRENATAL 1+1 PO), Take  by mouth., Disp: , Rfl:     valACYclovir (VALTREX) 500 MG tablet, Take 1 tablet by mouth Daily., Disp: , Rfl:     Allergies   Allergen Reactions    Aspirin Unknown - Low Severity     Reports she is not to take these due to her h/o Factor II and Factor V    Nsaids Unknown - Low Severity     Reports she is not to take these due to her h/o Factor II and Factor V       Family History   Problem Relation Age of Onset    Skin cancer Maternal Aunt     Skin cancer Maternal Uncle        Cancer-related family history includes Skin cancer in her maternal aunt and maternal uncle.    Social History     Tobacco Use    Smoking status: Never    Smokeless tobacco: Never   Vaping Use    Vaping Use: Never used   Substance Use Topics    Alcohol use: Never    Drug use: Never     I have reviewed and confirmed the accuracy of the patient's history: Chief complaint, HPI, ROS, and Subjective as entered by the MA/LPN/RN. Tiff De Dios MD 08/23/23      SUBJECTIVE:      Bruising at sites of injection    Patient denies any new issues    She is currently at 35 weeks gestation    REVIEW OF SYSTEMS:  Review of Systems  Abdominal wall bruises  OBJECTIVE:    Vitals:    08/23/23 1408   BP: 116/78   Pulse: 60   Resp: 18   Temp: 98 øF (36.7 øC)   TempSrc: Infrared   SpO2: 98%   Weight: 100 kg (221 lb)   Height: 170.2 cm (67\")   PainSc: 0-No pain     Body mass index is 34.61 kg/mý.    ECOG  (0) Fully active, able to carry on all predisease performance without " restriction    Physical Exam  Remarkable except for abdominal wall bruises, pregnant uterus      I have reexamined the patient and the results are consistent with the previously documented exam. Tiff De Dios MD      RECENT LABS    WBC   Date Value Ref Range Status   08/23/2023 11.60 (H) 3.40 - 10.80 10*3/mm3 Final     RBC   Date Value Ref Range Status   08/23/2023 3.84 3.77 - 5.28 10*6/mm3 Final     Hemoglobin   Date Value Ref Range Status   08/23/2023 11.3 (L) 12.0 - 15.9 g/dL Final     Hematocrit   Date Value Ref Range Status   08/23/2023 34.2 34.0 - 46.6 % Final     MCV   Date Value Ref Range Status   08/23/2023 89.1 79.0 - 97.0 fL Final     MCH   Date Value Ref Range Status   08/23/2023 29.4 26.6 - 33.0 pg Final     MCHC   Date Value Ref Range Status   08/23/2023 33.0 31.5 - 35.7 g/dL Final     RDW   Date Value Ref Range Status   08/23/2023 14.2 12.3 - 15.4 % Final     RDW-SD   Date Value Ref Range Status   08/23/2023 45.6 37.0 - 54.0 fl Final     MPV   Date Value Ref Range Status   08/23/2023 11.7 6.0 - 12.0 fL Final     Platelets   Date Value Ref Range Status   08/23/2023 152 140 - 450 10*3/mm3 Final     Neutrophil %   Date Value Ref Range Status   08/23/2023 74.4 42.7 - 76.0 % Final     Lymphocyte %   Date Value Ref Range Status   08/23/2023 19.9 19.6 - 45.3 % Final     Monocyte %   Date Value Ref Range Status   08/23/2023 5.3 5.0 - 12.0 % Final     Eosinophil %   Date Value Ref Range Status   08/23/2023 0.3 0.3 - 6.2 % Final     Basophil %   Date Value Ref Range Status   08/23/2023 0.1 0.0 - 1.5 % Final     Neutrophils, Absolute   Date Value Ref Range Status   08/23/2023 8.62 (H) 1.70 - 7.00 10*3/mm3 Final     Lymphocytes, Absolute   Date Value Ref Range Status   08/23/2023 2.31 0.70 - 3.10 10*3/mm3 Final     Monocytes, Absolute   Date Value Ref Range Status   08/23/2023 0.62 0.10 - 0.90 10*3/mm3 Final     Eosinophils, Absolute   Date Value Ref Range Status   08/23/2023 0.04 0.00 - 0.40 10*3/mm3  Final     Basophils, Absolute   Date Value Ref Range Status   2023 0.01 0.00 - 0.20 10*3/mm3 Final       No results found for: GLUCOSE, BUN, CREATININE, EGFRIFNONA, EGFRIFAFRI, BCR, K, CO2, CALCIUM, PROTENTOTREF, ALBUMIN, LABIL2, BILIRUBIN, AST, ALT      Assessment & Plan     Deep vein thrombosis (DVT) of non-extremity vein, unspecified chronicity  - CBC & Differential      ASSESSMENT:      Deep vein thrombosis (DVT) of non-extremity vein, unspecified chronicity  - CBC & Differential        IUP at 35 weeks    Mild anemia likely secondary to iron deficiency.  She will continue prenatal vitamins with iron.  Given additional iron tablets 325 mg daily  Abdominal wall bruising secondary to Lovenox.  Patient is currently on 40 mg every 12.  She has a prescription to transition to subcutaneous heparin at 36 weeks prescribed by her OB  Prothrombin gene heterozygous mutation  Factor V leidin heterozygous mutation  Positive lupus anticoagulant.  This will be checked 6 months postdelivery  Assessment has been reviewed and updated        Plans:     Transition to subcu heparin patient has a prescription from her gynecologist  Patient on aspirin 81 mg p.o. daily per maternal  fetal medicine recommendation likely contributing to her bruises.  She will follow-up with OB.  She will continue  Follow-up in 3 weeks  Continue to follow-up with her OB  Continue prenatal vitamins with iron.  Begin oral iron supplementation.  Hemoglobin 11.3 g per DL  All questions answered     Patient verbalized understanding and is in agreement of the above plan.        I spent 30 total minutes, face-to-face, caring for Nathalie today. 90% of this time involved counseling and/or coordination of care as documented within this note.

## 2023-09-07 ENCOUNTER — TELEPHONE (OUTPATIENT)
Dept: ONCOLOGY | Facility: CLINIC | Age: 33
End: 2023-09-07

## 2023-09-07 NOTE — TELEPHONE ENCOUNTER
Caller: Nathalie Panda    Relationship: Self    Best call back number: [phone]      What was the call regarding: PT IS CANCELING HER FOLLOW UP FOR NOW AND ALSO WANTED DR ESPINOSA TO KNOW SHE IS STAYING WITH DR HANDY

## 2023-09-27 ENCOUNTER — TELEPHONE (OUTPATIENT)
Dept: ONCOLOGY | Facility: CLINIC | Age: 33
End: 2023-09-27

## 2023-09-27 NOTE — TELEPHONE ENCOUNTER
"  Caller: Nathalie Panda    Relationship: Self    Best call back number: [phone]    What is the best time to reach you: ANY.[email or web address] VM    Who are you requesting to speak with (clinical staff, provider,  specific staff member): SCHEDULING        What was the call regarding: PATIENT CALLED TO SCHEDULE AN APPT WITH DR ESPINOSA. NATHALIE SAID IT NEEDED TO BE \"SIX WEEKS POST PARTEM\" FROM YESTERDAY 9/26/23. APPT NEEDS TO BE NOV 7TH OR LATER.    NATHALIE ALSO REQUESTS AN APPT AS LATE AS POSSIBLE IN THE DAY SO THAT SHE CAN MAKE HER APPT.    Is it okay if the provider responds through [facility]: YES      "

## 2023-11-08 ENCOUNTER — OFFICE VISIT (OUTPATIENT)
Dept: ONCOLOGY | Facility: CLINIC | Age: 33
End: 2023-11-08
Payer: COMMERCIAL

## 2023-11-08 ENCOUNTER — LAB (OUTPATIENT)
Dept: LAB | Facility: HOSPITAL | Age: 33
End: 2023-11-08
Payer: COMMERCIAL

## 2023-11-08 VITALS
TEMPERATURE: 98 F | HEART RATE: 77 BPM | SYSTOLIC BLOOD PRESSURE: 116 MMHG | OXYGEN SATURATION: 97 % | RESPIRATION RATE: 18 BRPM | WEIGHT: 202 LBS | DIASTOLIC BLOOD PRESSURE: 81 MMHG | HEIGHT: 67 IN | BODY MASS INDEX: 31.71 KG/M2

## 2023-11-08 DIAGNOSIS — I82.90 DEEP VEIN THROMBOSIS (DVT) OF NON-EXTREMITY VEIN, UNSPECIFIED CHRONICITY: Primary | ICD-10-CM

## 2023-11-08 PROBLEM — O12.10 GESTATIONAL PROTEINURIA: Status: ACTIVE | Noted: 2021-06-22

## 2023-11-08 PROBLEM — N92.6 IRREGULAR PERIODS: Status: ACTIVE | Noted: 2020-04-23

## 2023-11-08 PROBLEM — Z34.00 PRIMIGRAVIDA: Status: ACTIVE | Noted: 2020-12-24

## 2023-11-08 PROBLEM — E28.2 POLYCYSTIC OVARY SYNDROME: Status: ACTIVE | Noted: 2020-09-03

## 2023-11-08 LAB
BASOPHILS # BLD AUTO: 0.02 10*3/MM3 (ref 0–0.2)
BASOPHILS NFR BLD AUTO: 0.2 % (ref 0–1.5)
DEPRECATED RDW RBC AUTO: 47.2 FL (ref 37–54)
EOSINOPHIL # BLD AUTO: 0.13 10*3/MM3 (ref 0–0.4)
EOSINOPHIL NFR BLD AUTO: 1.5 % (ref 0.3–6.2)
ERYTHROCYTE [DISTWIDTH] IN BLOOD BY AUTOMATED COUNT: 14.3 % (ref 12.3–15.4)
HCT VFR BLD AUTO: 40.9 % (ref 34–46.6)
HGB BLD-MCNC: 13.2 G/DL (ref 12–15.9)
HOLD SPECIMEN: NORMAL
HOLD SPECIMEN: NORMAL
LYMPHOCYTES # BLD AUTO: 3.13 10*3/MM3 (ref 0.7–3.1)
LYMPHOCYTES NFR BLD AUTO: 35.3 % (ref 19.6–45.3)
MCH RBC QN AUTO: 29.9 PG (ref 26.6–33)
MCHC RBC AUTO-ENTMCNC: 32.3 G/DL (ref 31.5–35.7)
MCV RBC AUTO: 92.5 FL (ref 79–97)
MONOCYTES # BLD AUTO: 0.52 10*3/MM3 (ref 0.1–0.9)
MONOCYTES NFR BLD AUTO: 5.9 % (ref 5–12)
NEUTROPHILS NFR BLD AUTO: 5.07 10*3/MM3 (ref 1.7–7)
NEUTROPHILS NFR BLD AUTO: 57.1 % (ref 42.7–76)
PLATELET # BLD AUTO: 218 10*3/MM3 (ref 140–450)
PMV BLD AUTO: 11.7 FL (ref 6–12)
RBC # BLD AUTO: 4.42 10*6/MM3 (ref 3.77–5.28)
WBC NRBC COR # BLD: 8.87 10*3/MM3 (ref 3.4–10.8)
WHOLE BLOOD HOLD COAG: NORMAL

## 2023-11-08 PROCEDURE — 36415 COLL VENOUS BLD VENIPUNCTURE: CPT

## 2023-11-08 PROCEDURE — 85025 COMPLETE CBC W/AUTO DIFF WBC: CPT

## 2023-11-08 RX ORDER — NITROFURANTOIN 25; 75 MG/1; MG/1
CAPSULE ORAL
COMMUNITY
Start: 2023-11-03

## 2023-11-08 RX ORDER — L.ACID,CASEI/B.ANIMAL/S.THERMO 16B CELL
1 CAPSULE ORAL DAILY
COMMUNITY

## 2023-11-08 RX ORDER — ENOXAPARIN SODIUM 100 MG/ML
INJECTION SUBCUTANEOUS EVERY 12 HOURS SCHEDULED
COMMUNITY

## 2023-11-08 NOTE — PROGRESS NOTES
Hematology/Oncology Outpatient Follow Up    PATIENT NAME:Nathalie Panda  :1990  MRN: [id number]  PRIMARY CARE PHYSICIAN: Suhail Issa MD  REFERRING PHYSICIAN: Suhail Issa,*    Chief Complaint   Patient presents with    Follow-up     Deep vein thrombosis (DVT) of non-extremity vein, unspecified chronicity        HISTORY OF PRESENT ILLNESS:     This is a 33-year-old female who has a history of heterozygote mutation for prothrombin gene as well as heterozygote mutation for factor V Leiden.  In  patient developed right lower extremity deep venous thromboses following surgery.  Reviewed  Review of her labs show that she did have factor V heterozygote mutation identified on her thrombophilia work-up.  She was also found to have a heterozygote mutation for prothrombin gene as well and a positive lupus anticoagulants.  She had anticardiolipin antibodies which were negative Antithrombin III activity was normal and protein C activity was normal at 92%.          She is currently pregnant at 17 weeks gestation.  She is established with maternal-fetal medicine.  She is G2, P1.  With her first pregnancy, patient received prophylactic Lovenox 40 mg twice a day.  She is established with [facility] in [place] and has been recommended to receive Lovenox twice a day which she is actively receiving.     She had preeclampsia with her first pregnancy.  So far this pregnancy has been uneventful.  She denies any bleeding issues  Past Medical History:   Diagnosis Date    Pregnant        Past Surgical History:   Procedure Laterality Date    ANKLE SURGERY  2019    ANKLE SURGERY           Current Outpatient Medications:     nitrofurantoin, macrocrystal-monohydrate, (MACROBID) 100 MG capsule, , Disp: , Rfl:     Prenatal Vit-Fe Fumarate-FA (PRENATAL 1+1 PO), Take  by mouth., Disp: , Rfl:     valACYclovir (VALTREX) 500 MG tablet, Take 1 tablet by mouth Daily., Disp: , Rfl:     Allergies   Allergen Reactions    Aspirin  Unknown - Low Severity     Reports she is not to take these due to her h/o Factor II and Factor V    Nsaids Unknown - Low Severity     Reports she is not to take these due to her h/o Factor II and Factor V       Family History   Problem Relation Age of Onset    Skin cancer Maternal Aunt     Skin cancer Maternal Uncle        Cancer-related family history includes Skin cancer in her maternal aunt and maternal uncle.    Social History     Tobacco Use    Smoking status: Never    Smokeless tobacco: Never   Vaping Use    Vaping Use: Never used   Substance Use Topics    Alcohol use: Never    Drug use: Never     I have reviewed and confirmed the accuracy of the patient's history: Chief complaint, HPI, ROS, and Subjective as entered by the MA/LPN/RN. Tiff De Dios MD 11/08/23      SUBJECTIVE:     She has successfully delivered in September 2023.  She denies any new issues.  She remains on Lovenox prophylactic dose.    REVIEW OF SYSTEMS:  Review of Systems  Abdominal wall bruises  OBJECTIVE:    There were no vitals filed for this visit.    There is no height or weight on file to calculate BMI.    ECOG  (0) Fully active, able to carry on all predisease performance without restriction    Physical Exam  Remarkable except for abdominal wall bruises, pregnant uterus    I have reexamined the patient and the results are consistent with the previously documented exam. Tiff De Dios MD        RECENT LABS    WBC   Date Value Ref Range Status   11/08/2023 8.87 3.40 - 10.80 10*3/mm3 Final     RBC   Date Value Ref Range Status   11/08/2023 4.42 3.77 - 5.28 10*6/mm3 Final     Hemoglobin   Date Value Ref Range Status   11/08/2023 13.2 12.0 - 15.9 g/dL Final     Hematocrit   Date Value Ref Range Status   11/08/2023 40.9 34.0 - 46.6 % Final     MCV   Date Value Ref Range Status   11/08/2023 92.5 79.0 - 97.0 fL Final     MCH   Date Value Ref Range Status   11/08/2023 29.9 26.6 - 33.0 pg Final     MCHC   Date Value Ref Range  "Status   11/08/2023 32.3 31.5 - 35.7 g/dL Final     RDW   Date Value Ref Range Status   11/08/2023 14.3 12.3 - 15.4 % Final     RDW-SD   Date Value Ref Range Status   11/08/2023 47.2 37.0 - 54.0 fl Final     MPV   Date Value Ref Range Status   11/08/2023 11.7 6.0 - 12.0 fL Final     Platelets   Date Value Ref Range Status   11/08/2023 218 140 - 450 10*3/mm3 Final     Neutrophil %   Date Value Ref Range Status   11/08/2023 57.1 42.7 - 76.0 % Final     Lymphocyte %   Date Value Ref Range Status   11/08/2023 35.3 19.6 - 45.3 % Final     Monocyte %   Date Value Ref Range Status   11/08/2023 5.9 5.0 - 12.0 % Final     Eosinophil %   Date Value Ref Range Status   11/08/2023 1.5 0.3 - 6.2 % Final     Basophil %   Date Value Ref Range Status   11/08/2023 0.2 0.0 - 1.5 % Final     Neutrophils, Absolute   Date Value Ref Range Status   11/08/2023 5.07 1.70 - 7.00 10*3/mm3 Final     Lymphocytes, Absolute   Date Value Ref Range Status   11/08/2023 3.13 (H) 0.70 - 3.10 10*3/mm3 Final     Monocytes, Absolute   Date Value Ref Range Status   11/08/2023 0.52 0.10 - 0.90 10*3/mm3 Final     Eosinophils, Absolute   Date Value Ref Range Status   11/08/2023 0.13 0.00 - 0.40 10*3/mm3 Final     Basophils, Absolute   Date Value Ref Range Status   11/08/2023 0.02 0.00 - 0.20 10*3/mm3 Final       No results found for: \"GLUCOSE\", \"BUN\", \"CREATININE\", \"EGFRIFNONA\", \"EGFRIFAFRI\", \"BCR\", \"K\", \"CO2\", \"CALCIUM\", \"PROTENTOTREF\", \"ALBUMIN\", \"LABIL2\", \"BILIRUBIN\", \"AST\", \"ALT\"      Assessment & Plan     Deep vein thrombosis (DVT) of non-extremity vein, unspecified chronicity  - CBC & Differential        ASSESSMENT:      Deep vein thrombosis (DVT) of non-extremity vein, unspecified chronicity  - CBC & Differential        Thrombophilia with pregnancy.  Patient delivered September 2023  History of right lower extremity DVT popliteal vein 2020 following right calf surgery  History of iron deficiency anemia with pregnancy  Received Lovenox 40 mg every 12 " hours with pregnancy.  Currently on Lovenox 40 mg daily.  Patient is still breast-feeding  Prothrombin gene heterozygous mutation  Factor V leidin heterozygous mutation  Positive lupus anticoagulant.  This will be checked 6 months postdelivery.  This will be rechecked in March 2024 and decisions will be made regarding anticoagulation therapy  Assessment has been reviewed and updated        Plans:     Lupus anticoagulant anticardiolipin antibodies and beta-2 glycoprotein March 2024 and see me shortly after that  Discussed discontinuing Lovenox at that point which will be 6 months postpartum if lupus anticoagulant is negative.  With consideration for enteric-coated aspirin  All questions answered  Anemia has resolved  All questions answered     Patient verbalized understanding and is in agreement of the above plan.        .    I spent 30 total minutes, face-to-face, caring for Nathalie today. 90% of this time involved counseling and/or coordination of care as documented within this note.

## 2023-11-08 NOTE — LETTER
2023     Suhail Issa MD  [address] [place] [address] Dr Myrna NATHAN  [place] [address]    Patient: Nathalie Panda   YOB: 1990   Date of Visit: 2023     Dear Suhail Issa MD:       Thank you for referring Nathalie Panda to me for evaluation. Below are the relevant portions of my assessment and plan of care.    If you have questions, please do not hesitate to call me. I look forward to following Nathalie along with you.         Sincerely,        Tiff De Dios MD        CC: MD Va Calderon, Tiff Cain MD  23 1726  Sign when Signing Visit   Hematology/Oncology Outpatient Follow Up    PATIENT NAME:Nathalie Panda  :1990  MRN: [id number]  PRIMARY CARE PHYSICIAN: Suhail Issa MD  REFERRING PHYSICIAN: Suhail Issa,*    Chief Complaint   Patient presents with   • Follow-up     Deep vein thrombosis (DVT) of non-extremity vein, unspecified chronicity        HISTORY OF PRESENT ILLNESS:     This is a 33-year-old female who has a history of heterozygote mutation for prothrombin gene as well as heterozygote mutation for factor V Leiden.  In  patient developed right lower extremity deep venous thromboses following surgery.  Reviewed  Review of her labs show that she did have factor V heterozygote mutation identified on her thrombophilia work-up.  She was also found to have a heterozygote mutation for prothrombin gene as well and a positive lupus anticoagulants.  She had anticardiolipin antibodies which were negative Antithrombin III activity was normal and protein C activity was normal at 92%.          She is currently pregnant at 17 weeks gestation.  She is established with maternal-fetal medicine.  She is G2, P1.  With her first pregnancy, patient received prophylactic Lovenox 40 mg twice a day.  She is established with [facility] in [place] and has been recommended to receive Lovenox twice a day which she is actively  receiving.     She had preeclampsia with her first pregnancy.  So far this pregnancy has been uneventful.  She denies any bleeding issues  Past Medical History:   Diagnosis Date   • Pregnant        Past Surgical History:   Procedure Laterality Date   • ANKLE SURGERY  2019   • ANKLE SURGERY           Current Outpatient Medications:   •  nitrofurantoin, macrocrystal-monohydrate, (MACROBID) 100 MG capsule, , Disp: , Rfl:   •  Prenatal Vit-Fe Fumarate-FA (PRENATAL 1+1 PO), Take  by mouth., Disp: , Rfl:   •  valACYclovir (VALTREX) 500 MG tablet, Take 1 tablet by mouth Daily., Disp: , Rfl:     Allergies   Allergen Reactions   • Aspirin Unknown - Low Severity     Reports she is not to take these due to her h/o Factor II and Factor V   • Nsaids Unknown - Low Severity     Reports she is not to take these due to her h/o Factor II and Factor V       Family History   Problem Relation Age of Onset   • Skin cancer Maternal Aunt    • Skin cancer Maternal Uncle        Cancer-related family history includes Skin cancer in her maternal aunt and maternal uncle.    Social History     Tobacco Use   • Smoking status: Never   • Smokeless tobacco: Never   Vaping Use   • Vaping Use: Never used   Substance Use Topics   • Alcohol use: Never   • Drug use: Never     I have reviewed and confirmed the accuracy of the patient's history: Chief complaint, HPI, ROS, and Subjective as entered by the MA/LPN/RN. Tiffsly De Dios MD 11/08/23      SUBJECTIVE:     She has successfully delivered in September 2023.  She denies any new issues.  She remains on Lovenox prophylactic dose.    REVIEW OF SYSTEMS:  Review of Systems  Abdominal wall bruises  OBJECTIVE:    There were no vitals filed for this visit.    There is no height or weight on file to calculate BMI.    ECOG  (0) Fully active, able to carry on all predisease performance without restriction    Physical Exam  Remarkable except for abdominal wall bruises, pregnant uterus    I have reexamined  the patient and the results are consistent with the previously documented exam. Tiff Payton De Dios MD        RECENT LABS    WBC   Date Value Ref Range Status   11/08/2023 8.87 3.40 - 10.80 10*3/mm3 Final     RBC   Date Value Ref Range Status   11/08/2023 4.42 3.77 - 5.28 10*6/mm3 Final     Hemoglobin   Date Value Ref Range Status   11/08/2023 13.2 12.0 - 15.9 g/dL Final     Hematocrit   Date Value Ref Range Status   11/08/2023 40.9 34.0 - 46.6 % Final     MCV   Date Value Ref Range Status   11/08/2023 92.5 79.0 - 97.0 fL Final     MCH   Date Value Ref Range Status   11/08/2023 29.9 26.6 - 33.0 pg Final     MCHC   Date Value Ref Range Status   11/08/2023 32.3 31.5 - 35.7 g/dL Final     RDW   Date Value Ref Range Status   11/08/2023 14.3 12.3 - 15.4 % Final     RDW-SD   Date Value Ref Range Status   11/08/2023 47.2 37.0 - 54.0 fl Final     MPV   Date Value Ref Range Status   11/08/2023 11.7 6.0 - 12.0 fL Final     Platelets   Date Value Ref Range Status   11/08/2023 218 140 - 450 10*3/mm3 Final     Neutrophil %   Date Value Ref Range Status   11/08/2023 57.1 42.7 - 76.0 % Final     Lymphocyte %   Date Value Ref Range Status   11/08/2023 35.3 19.6 - 45.3 % Final     Monocyte %   Date Value Ref Range Status   11/08/2023 5.9 5.0 - 12.0 % Final     Eosinophil %   Date Value Ref Range Status   11/08/2023 1.5 0.3 - 6.2 % Final     Basophil %   Date Value Ref Range Status   11/08/2023 0.2 0.0 - 1.5 % Final     Neutrophils, Absolute   Date Value Ref Range Status   11/08/2023 5.07 1.70 - 7.00 10*3/mm3 Final     Lymphocytes, Absolute   Date Value Ref Range Status   11/08/2023 3.13 (H) 0.70 - 3.10 10*3/mm3 Final     Monocytes, Absolute   Date Value Ref Range Status   11/08/2023 0.52 0.10 - 0.90 10*3/mm3 Final     Eosinophils, Absolute   Date Value Ref Range Status   11/08/2023 0.13 0.00 - 0.40 10*3/mm3 Final     Basophils, Absolute   Date Value Ref Range Status   11/08/2023 0.02 0.00 - 0.20 10*3/mm3 Final       No results  "found for: \"GLUCOSE\", \"BUN\", \"CREATININE\", \"EGFRIFNONA\", \"EGFRIFAFRI\", \"BCR\", \"K\", \"CO2\", \"CALCIUM\", \"PROTENTOTREF\", \"ALBUMIN\", \"LABIL2\", \"BILIRUBIN\", \"AST\", \"ALT\"      Assessment & Plan    Deep vein thrombosis (DVT) of non-extremity vein, unspecified chronicity  - CBC & Differential        ASSESSMENT:      Deep vein thrombosis (DVT) of non-extremity vein, unspecified chronicity  - CBC & Differential        Thrombophilia with pregnancy.  Patient delivered September 2023  History of right lower extremity DVT popliteal vein 2020 following right calf surgery  History of iron deficiency anemia with pregnancy  Received Lovenox 40 mg every 12 hours with pregnancy.  Currently on Lovenox 40 mg daily.  Patient is still breast-feeding  Prothrombin gene heterozygous mutation  Factor V leidin heterozygous mutation  Positive lupus anticoagulant.  This will be checked 6 months postdelivery.  This will be rechecked in March 2024 and decisions will be made regarding anticoagulation therapy  Assessment has been reviewed and updated        Plans:     Lupus anticoagulant anticardiolipin antibodies and beta-2 glycoprotein March 2024 and see me shortly after that  Discussed discontinuing Lovenox at that point which will be 6 months postpartum if lupus anticoagulant is negative.  With consideration for enteric-coated aspirin  All questions answered  Anemia has resolved  All questions answered     Patient verbalized understanding and is in agreement of the above plan.        .    I spent 30 total minutes, face-to-face, caring for Nathalie today. 90% of this time involved counseling and/or coordination of care as documented within this note.     "

## 2023-11-13 ENCOUNTER — TELEPHONE (OUTPATIENT)
Dept: ONCOLOGY | Facility: CLINIC | Age: 33
End: 2023-11-13
Payer: COMMERCIAL

## 2023-11-13 ENCOUNTER — HOSPITAL ENCOUNTER (OUTPATIENT)
Dept: CARDIOLOGY | Facility: HOSPITAL | Age: 33
Discharge: HOME | End: 2023-11-13
Admitting: NURSE PRACTITIONER
Payer: COMMERCIAL

## 2023-11-13 DIAGNOSIS — M79.651 PAIN IN RIGHT THIGH: Primary | ICD-10-CM

## 2023-11-13 DIAGNOSIS — M79.651 PAIN IN RIGHT THIGH: ICD-10-CM

## 2023-11-13 LAB
BH CV LOWER VASCULAR LEFT COMMON FEMORAL AUGMENT: NORMAL
BH CV LOWER VASCULAR LEFT COMMON FEMORAL COMPETENT: NORMAL
BH CV LOWER VASCULAR LEFT COMMON FEMORAL COMPRESS: NORMAL
BH CV LOWER VASCULAR LEFT COMMON FEMORAL PHASIC: NORMAL
BH CV LOWER VASCULAR LEFT COMMON FEMORAL SPONT: NORMAL
BH CV LOWER VASCULAR RIGHT COMMON FEMORAL AUGMENT: NORMAL
BH CV LOWER VASCULAR RIGHT COMMON FEMORAL COMPETENT: NORMAL
BH CV LOWER VASCULAR RIGHT COMMON FEMORAL COMPRESS: NORMAL
BH CV LOWER VASCULAR RIGHT COMMON FEMORAL PHASIC: NORMAL
BH CV LOWER VASCULAR RIGHT COMMON FEMORAL SPONT: NORMAL
BH CV LOWER VASCULAR RIGHT DISTAL FEMORAL COMPRESS: NORMAL
BH CV LOWER VASCULAR RIGHT GASTRONEMIUS COMPRESS: NORMAL
BH CV LOWER VASCULAR RIGHT GREATER SAPH AK COMPRESS: NORMAL
BH CV LOWER VASCULAR RIGHT GREATER SAPH BK COMPRESS: NORMAL
BH CV LOWER VASCULAR RIGHT LESSER SAPH COMPRESS: NORMAL
BH CV LOWER VASCULAR RIGHT MID FEMORAL AUGMENT: NORMAL
BH CV LOWER VASCULAR RIGHT MID FEMORAL COMPETENT: NORMAL
BH CV LOWER VASCULAR RIGHT MID FEMORAL COMPRESS: NORMAL
BH CV LOWER VASCULAR RIGHT MID FEMORAL PHASIC: NORMAL
BH CV LOWER VASCULAR RIGHT MID FEMORAL SPONT: NORMAL
BH CV LOWER VASCULAR RIGHT PERONEAL COMPRESS: NORMAL
BH CV LOWER VASCULAR RIGHT POPLITEAL AUGMENT: NORMAL
BH CV LOWER VASCULAR RIGHT POPLITEAL COMPETENT: NORMAL
BH CV LOWER VASCULAR RIGHT POPLITEAL COMPRESS: NORMAL
BH CV LOWER VASCULAR RIGHT POPLITEAL PHASIC: NORMAL
BH CV LOWER VASCULAR RIGHT POPLITEAL SPONT: NORMAL
BH CV LOWER VASCULAR RIGHT POSTERIOR TIBIAL COMPRESS: NORMAL
BH CV LOWER VASCULAR RIGHT PROXIMAL FEMORAL COMPRESS: NORMAL
BH CV LOWER VASCULAR RIGHT SAPHENOFEMORAL JUNCTION COMPRESS: NORMAL
BH CV VAS PRELIMINARY FINDINGS SCRIPTING: 1

## 2023-11-13 PROCEDURE — 93971 EXTREMITY STUDY: CPT

## 2023-11-13 NOTE — TELEPHONE ENCOUNTER
RECEIVED A PHONE CALL FROM THE PATIENT REGARDING CURRENT SYMPTOMS SHE IS EXPERIENCING. SHE STATED SHE IS CURRENTLY HAVING PAIN IN THE INNER THIGH OF HER RIGHT LEG WHICH IS THE SAME LEG HER PREVIOUS BLOOD CLOT WAS IN. PATIENT DENIED HAVING ANY REDNESS OR SWELLING IN THE AREA OF THE PAIN. SHE STATED SHE IS 7 WEEKS POST-PARTUM AND IS TRYING TO AVOID GOING TO THE ER. I INFORMED HER THAT I WILL SPEAK WITH KANCHAN FERREIRA THEN I WILL RETURN TO THE CALL. I INFORMED HER THAT HANNA WILL MOST LIKELY WANT HER TO HAVE A DOPPLER COMPLETED AND I ASKED IF SHE WILL BE ABLE TO GO TODAY TO HAVE THAT COMPLETED IF THAT IS WHAT HANNA ORDERS. PATIENT STATED SHE WILL MAKE IT WORK SO SHE CAN GO TODAY. I CONFIRMED. PATIENT PLACED ON HOLD.     ORDER PLACED FOR DOPPLER OF RIGHT LOWER EXTREMITY PER KANCHAN FERREIRA.     APPT SCHEDULED BY JATINDER WITH THE SCHEDULING HUB. PROVIDED JATINDER WITH KANCHAN FERREIRA CALLBACK NUMBER TO CONTACT FOR THE HOLD AND CALL ([phone]). I ASKED THE PATIENT IF SHE WOULD BE ABLE TO [facility]HF NOW TO HAVE THE DOPPLER COMPLETED; SHE STATED SHE IS WAITING FOR HER  TO GET HOME SO SHE CAN GO HAVE THE SCAN COMPLETED DUE TO HAVE TWO SMALL CHILDREN. I CONFIRMED AND ADVISED THAT SHE GET THERE [facility] AND I STATED THAT SHE WILL NEED TO BE THERE BEFORE 1730 PER THE DEPARTMENT THAT SHE IS HAVING THE SCAN COMPLETED IN. SHE CONFIRMED. NO FURTHER QUESTIONS AT THIS TIME.

## 2024-01-02 NOTE — TELEPHONE ENCOUNTER
Caller: Nathalie Panda    Relationship: Self    Best call back number: [phone]    Requested Prescriptions:   Requested Prescriptions     Pending Prescriptions Disp Refills    Enoxaparin Sodium (LOVENOX) 40 MG/0.4ML solution prefilled syringe syringe       Sig: Inject  under the skin into the appropriate area as directed Every 12 (Twelve) Hours.        Pharmacy where request should be sent:  [facility]     Last office visit with prescribing clinician: 11/8/2023   Last telemedicine visit with prescribing clinician: Visit date not found   Next office visit with prescribing clinician: 4/10/2024     Additional details provided by patient: NA    Does the patient have less than a 3 day supply:  [] Yes  [x] No    Would you like a call back once the refill request has been completed: [x] Yes [] No    If the office needs to give you a call back, can they leave a voicemail: [x] Yes [] No    May Hargrove Rep   01/02/24 10:24 EST

## 2024-01-03 RX ORDER — ENOXAPARIN SODIUM 100 MG/ML
40 INJECTION SUBCUTANEOUS EVERY 12 HOURS SCHEDULED
Qty: 24 ML | Refills: 2 | Status: SHIPPED | OUTPATIENT
Start: 2024-01-03

## 2024-04-03 ENCOUNTER — LAB (OUTPATIENT)
Dept: LAB | Facility: HOSPITAL | Age: 34
End: 2024-04-03
Payer: COMMERCIAL

## 2024-04-03 DIAGNOSIS — I82.90 DEEP VEIN THROMBOSIS (DVT) OF NON-EXTREMITY VEIN, UNSPECIFIED CHRONICITY: ICD-10-CM

## 2024-04-03 LAB
BASOPHILS # BLD AUTO: 0.02 10*3/MM3 (ref 0–0.2)
BASOPHILS NFR BLD AUTO: 0.3 % (ref 0–1.5)
DEPRECATED RDW RBC AUTO: 41.6 FL (ref 37–54)
EOSINOPHIL # BLD AUTO: 0.09 10*3/MM3 (ref 0–0.4)
EOSINOPHIL NFR BLD AUTO: 1.1 % (ref 0.3–6.2)
ERYTHROCYTE [DISTWIDTH] IN BLOOD BY AUTOMATED COUNT: 12.7 % (ref 12.3–15.4)
HCT VFR BLD AUTO: 39.1 % (ref 34–46.6)
HGB BLD-MCNC: 12.9 G/DL (ref 12–15.9)
LYMPHOCYTES # BLD AUTO: 2.59 10*3/MM3 (ref 0.7–3.1)
LYMPHOCYTES NFR BLD AUTO: 33 % (ref 19.6–45.3)
MCH RBC QN AUTO: 30.4 PG (ref 26.6–33)
MCHC RBC AUTO-ENTMCNC: 33 G/DL (ref 31.5–35.7)
MCV RBC AUTO: 92 FL (ref 79–97)
MONOCYTES # BLD AUTO: 0.43 10*3/MM3 (ref 0.1–0.9)
MONOCYTES NFR BLD AUTO: 5.5 % (ref 5–12)
NEUTROPHILS NFR BLD AUTO: 4.72 10*3/MM3 (ref 1.7–7)
NEUTROPHILS NFR BLD AUTO: 60.1 % (ref 42.7–76)
PLATELET # BLD AUTO: 193 10*3/MM3 (ref 140–450)
PMV BLD AUTO: 11.6 FL (ref 6–12)
RBC # BLD AUTO: 4.25 10*6/MM3 (ref 3.77–5.28)
WBC NRBC COR # BLD AUTO: 7.85 10*3/MM3 (ref 3.4–10.8)

## 2024-04-03 PROCEDURE — 36415 COLL VENOUS BLD VENIPUNCTURE: CPT

## 2024-04-03 PROCEDURE — 85025 COMPLETE CBC W/AUTO DIFF WBC: CPT

## 2024-04-04 LAB
CARDIOLIPIN IGG SER IA-ACNC: <9 GPL U/ML (ref 0–14)
CARDIOLIPIN IGM SER IA-ACNC: <9 MPL U/ML (ref 0–12)

## 2024-04-05 LAB
APTT SCREEN TO CONFIRM RATIO: 1.11 RATIO (ref 0–1.34)
B2 GLYCOPROT1 IGA SER-ACNC: <9 GPI IGA UNITS (ref 0–25)
B2 GLYCOPROT1 IGG SER-ACNC: <9 GPI IGG UNITS (ref 0–20)
B2 GLYCOPROT1 IGM SER-ACNC: <9 GPI IGM UNITS (ref 0–32)
CONFIRM APTT/NORMAL: 44.4 SEC (ref 0–47.6)
LA 2 SCREEN W REFLEX-IMP: NORMAL
SCREEN APTT: 35.1 SEC (ref 0–43.5)
SCREEN DRVVT: 31.1 SEC (ref 0–47)
THROMBIN TIME: 18.4 SEC (ref 0–23)

## 2024-04-09 NOTE — PROGRESS NOTES
Hematology/Oncology Outpatient Follow Up    PATIENT NAME:Nathalie Panda  :1990  MRN: [id number]  PRIMARY CARE PHYSICIAN: Suhail Issa MD  REFERRING PHYSICIAN: No ref. provider found    Chief Complaint   Patient presents with    Follow-up     Deep vein thrombosis (DVT) of non-extremity vein, unspecified chronicity        HISTORY OF PRESENT ILLNESS:     This is a 33-year-old female who has a history of heterozygote mutation for prothrombin gene as well as heterozygote mutation for factor V Leiden.  In  patient developed right lower extremity deep venous thromboses following surgery.  Reviewed  Review of her labs show that she did have factor V heterozygote mutation identified on her thrombophilia work-up.  She was also found to have a heterozygote mutation for prothrombin gene as well and a positive lupus anticoagulants.  She had anticardiolipin antibodies which were negative Antithrombin III activity was normal and protein C activity was normal at 92%.          She is currently pregnant at 17 weeks gestation.  She is established with maternal-fetal medicine.  She is G2, P1.  With her first pregnancy, patient received prophylactic Lovenox 40 mg twice a day.  She is established with [facility] in [place] and has been recommended to receive Lovenox twice a day which she is actively receiving.     She had preeclampsia with her first pregnancy.  So far this pregnancy has been uneventful.  She denies any bleeding issues  Past Medical History:   Diagnosis Date    Pregnant        Past Surgical History:   Procedure Laterality Date    ANKLE SURGERY      ANKLE SURGERY           Current Outpatient Medications:     Enoxaparin Sodium (LOVENOX) 40 MG/0.4ML solution prefilled syringe syringe, Inject 0.4 mL under the skin into the appropriate area as directed Every 12 (Twelve) Hours., Disp: 24 mL, Rfl: 2    Prenatal Vit-Fe Fumarate-FA (PRENATAL 1+1 PO), Take  by mouth., Disp: , Rfl:     Probiotic Product  (Risaquad-2) capsule capsule, Take 1 capsule by mouth Daily., Disp: , Rfl:     valACYclovir (VALTREX) 500 MG tablet, Take 1 tablet by mouth Daily., Disp: , Rfl:     Allergies   Allergen Reactions    Aspirin Unknown - Low Severity     Reports she is not to take these due to her h/o Factor II and Factor V    Nsaids Unknown - Low Severity     Reports she is not to take these due to her h/o Factor II and Factor V       Family History   Problem Relation Age of Onset    Skin cancer Maternal Aunt     Skin cancer Maternal Uncle        Cancer-related family history includes Skin cancer in her maternal aunt and maternal uncle.    Social History     Tobacco Use    Smoking status: Never    Smokeless tobacco: Never   Vaping Use    Vaping status: Never Used   Substance Use Topics    Alcohol use: Never    Drug use: Never         I have reviewed and confirmed the accuracy of the patient's history: Chief complaint, HPI, ROS, and Subjective as entered by the MA/LPN/RN. Tiff De Dios MD 04/10/24      SUBJECTIVE:     She has successfully delivered in September 2023.  She denies any new issues.  She remains on Lovenox prophylactic dose.    Patient is still nursing .  Since 2020 when she had a clot she has been on anticoagulation therapy    REVIEW OF SYSTEMS:  Review of Systems   Constitutional:  Negative for chills, fatigue and fever.   HENT:  Negative for congestion, drooling, ear discharge, rhinorrhea, sinus pressure and tinnitus.    Eyes:  Negative for photophobia, pain and discharge.   Respiratory:  Negative for apnea, choking and stridor.    Cardiovascular:  Negative for palpitations.   Gastrointestinal:  Negative for abdominal distention, abdominal pain and anal bleeding.   Endocrine: Negative for polydipsia and polyphagia.   Genitourinary:  Negative for decreased urine volume, flank pain and genital sores.   Musculoskeletal:  Negative for gait problem, neck pain and neck stiffness.   Skin:  Negative for color change,  "rash and wound.   Neurological:  Negative for tremors, seizures, syncope, facial asymmetry and speech difficulty.   Hematological:  Negative for adenopathy.   Psychiatric/Behavioral:  Negative for agitation, confusion, hallucinations and self-injury. The patient is not hyperactive.        OBJECTIVE:    Vitals:    04/10/24 1022   BP: 117/75   Pulse: 65   Resp: 18   Temp: 98 °F (36.7 °C)   TempSrc: Infrared   SpO2: 99%   Weight: 93.4 kg (206 lb)   Height: 170.2 cm (67\")   PainSc: 0-No pain       Body mass index is 32.26 kg/m².    ECOG  (0) Fully active, able to carry on all predisease performance without restriction    Physical Exam  Vitals and nursing note reviewed.   Constitutional:       General: She is not in acute distress.     Appearance: She is not diaphoretic.   HENT:      Head: Normocephalic and atraumatic.   Eyes:      General: No scleral icterus.        Right eye: No discharge.         Left eye: No discharge.      Conjunctiva/sclera: Conjunctivae normal.   Neck:      Thyroid: No thyromegaly.   Cardiovascular:      Rate and Rhythm: Normal rate and regular rhythm.      Heart sounds: Normal heart sounds.      No friction rub. No gallop.   Pulmonary:      Effort: Pulmonary effort is normal. No respiratory distress.      Breath sounds: No stridor. No wheezing.   Abdominal:      General: Bowel sounds are normal.      Palpations: Abdomen is soft. There is no mass.      Tenderness: There is no abdominal tenderness. There is no guarding or rebound.   Musculoskeletal:         General: No tenderness. Normal range of motion.      Cervical back: Normal range of motion and neck supple.   Lymphadenopathy:      Cervical: No cervical adenopathy.   Skin:     General: Skin is warm.      Findings: No erythema or rash.   Neurological:      Mental Status: She is alert and oriented to person, place, and time.      Motor: No abnormal muscle tone.   Psychiatric:         Behavior: Behavior normal.       Remarkable except for " "abdominal wall bruises, pregnant uterus          RECENT LABS    WBC   Date Value Ref Range Status   04/03/2024 7.85 3.40 - 10.80 10*3/mm3 Final     RBC   Date Value Ref Range Status   04/03/2024 4.25 3.77 - 5.28 10*6/mm3 Final     Hemoglobin   Date Value Ref Range Status   04/03/2024 12.9 12.0 - 15.9 g/dL Final     Hematocrit   Date Value Ref Range Status   04/03/2024 39.1 34.0 - 46.6 % Final     MCV   Date Value Ref Range Status   04/03/2024 92.0 79.0 - 97.0 fL Final     MCH   Date Value Ref Range Status   04/03/2024 30.4 26.6 - 33.0 pg Final     MCHC   Date Value Ref Range Status   04/03/2024 33.0 31.5 - 35.7 g/dL Final     RDW   Date Value Ref Range Status   04/03/2024 12.7 12.3 - 15.4 % Final     RDW-SD   Date Value Ref Range Status   04/03/2024 41.6 37.0 - 54.0 fl Final     MPV   Date Value Ref Range Status   04/03/2024 11.6 6.0 - 12.0 fL Final     Platelets   Date Value Ref Range Status   04/03/2024 193 140 - 450 10*3/mm3 Final     Neutrophil %   Date Value Ref Range Status   04/03/2024 60.1 42.7 - 76.0 % Final     Lymphocyte %   Date Value Ref Range Status   04/03/2024 33.0 19.6 - 45.3 % Final     Monocyte %   Date Value Ref Range Status   04/03/2024 5.5 5.0 - 12.0 % Final     Eosinophil %   Date Value Ref Range Status   04/03/2024 1.1 0.3 - 6.2 % Final     Basophil %   Date Value Ref Range Status   04/03/2024 0.3 0.0 - 1.5 % Final     Neutrophils, Absolute   Date Value Ref Range Status   04/03/2024 4.72 1.70 - 7.00 10*3/mm3 Final     Lymphocytes, Absolute   Date Value Ref Range Status   04/03/2024 2.59 0.70 - 3.10 10*3/mm3 Final     Monocytes, Absolute   Date Value Ref Range Status   04/03/2024 0.43 0.10 - 0.90 10*3/mm3 Final     Eosinophils, Absolute   Date Value Ref Range Status   04/03/2024 0.09 0.00 - 0.40 10*3/mm3 Final     Basophils, Absolute   Date Value Ref Range Status   04/03/2024 0.02 0.00 - 0.20 10*3/mm3 Final       No results found for: \"GLUCOSE\", \"BUN\", \"CREATININE\", \"EGFRIFNONA\", \"EGFRIFAFRI\", " "\"BCR\", \"K\", \"CO2\", \"CALCIUM\", \"PROTENTOTREF\", \"ALBUMIN\", \"LABIL2\", \"BILIRUBIN\", \"AST\", \"ALT\"      Assessment & Plan     There are no diagnoses linked to this encounter.        ASSESSMENT:      Deep vein thrombosis (DVT) of non-extremity vein, unspecified chronicity  - CBC & Differential        Thrombophilia with pregnancy.  Patient delivered September 2023  History of right lower extremity DVT popliteal vein 2020 following right calf surgery.  Patient has been on anticoagulation therapy since then  History of iron deficiency anemia with pregnancy.  CBC stable  Received Lovenox 40 mg every 12 hours with pregnancy.  Currently on Lovenox 40 mg daily.  Patient is still breast-feeding  Prothrombin gene heterozygous mutation  Factor V leidin heterozygous mutation  Positive lupus anticoagulant.  This will be checked 6 months postdelivery.  This will be rechecked in March 2024 and decisions will be made regarding anticoagulation therapy  Assessment has been reviewed and updated        Plans:     Lupus anticoagulant anticardiolipin antibodies and beta-2 glycoprotein March 2024 negative  Continue Lovenox until she is done with breast-feeding then switch to Eliquis 2.5 mg twice a day.  Patient is more comfortable continuing on anticoagulation therapy given her increased risk for clots  CBC reviewed  Follow-up in 6 months  Hemoglobin is now 12.9 g per DL  All questions answered     Patient verbalized understanding and is in agreement of the above plan.        .   I spent 30 total minutes, answering all her questions regarding her clotting risks, face-to-face, caring for Nathalie today. 90% of this time involved counseling and/or coordination of care as documented within this note.     "

## 2024-04-10 ENCOUNTER — OFFICE VISIT (OUTPATIENT)
Dept: ONCOLOGY | Facility: CLINIC | Age: 34
End: 2024-04-10
Payer: COMMERCIAL

## 2024-04-10 VITALS
WEIGHT: 206 LBS | BODY MASS INDEX: 32.33 KG/M2 | RESPIRATION RATE: 18 BRPM | DIASTOLIC BLOOD PRESSURE: 75 MMHG | TEMPERATURE: 98 F | HEIGHT: 67 IN | SYSTOLIC BLOOD PRESSURE: 117 MMHG | HEART RATE: 65 BPM | OXYGEN SATURATION: 99 %

## 2024-04-10 DIAGNOSIS — I82.90 DEEP VEIN THROMBOSIS (DVT) OF NON-EXTREMITY VEIN, UNSPECIFIED CHRONICITY: Primary | ICD-10-CM

## 2024-04-10 PROBLEM — Z87.81 HISTORY OF FRACTURE: Status: ACTIVE | Noted: 2020-07-27

## 2024-04-10 PROBLEM — Z86.718 HISTORY OF DEEP VEIN THROMBOSIS: Status: ACTIVE | Noted: 2020-07-27

## 2024-04-10 PROBLEM — S99.919A INJURY OF ANKLE: Status: ACTIVE | Noted: 2020-07-27

## 2024-04-10 PROBLEM — U07.1 COVID-19: Status: ACTIVE | Noted: 2020-12-01

## 2024-04-10 PROBLEM — J30.2 SEASONAL ALLERGIES: Status: ACTIVE | Noted: 2020-07-27

## 2024-04-10 PROBLEM — G43.909 MIGRAINE: Status: ACTIVE | Noted: 2020-07-27

## 2024-06-19 ENCOUNTER — TELEPHONE (OUTPATIENT)
Dept: ONCOLOGY | Facility: CLINIC | Age: 34
End: 2024-06-19

## 2024-06-19 NOTE — TELEPHONE ENCOUNTER
Per Dr. De Dios's last office note: Continue Lovenox until she is done with breast-feeding then switch to Eliquis 2.5 mg twice a day.   Prescription escribed to pharmacy.

## 2024-06-19 NOTE — TELEPHONE ENCOUNTER
Caller: Nathalie Panda    Relationship: Self    Best call back number: [phone]    What is the best time to reach you: ANY    Who are you requesting to speak with (clinical staff, provider,  specific staff member): CLINICAL     What was the call regarding: NATHALIE IS CALLING STATES SHE IS FINISHING NURSING HER CHILD AND SHE IS READY TO BE SWITCH TO THE ELIQUIS    SHE WOULD LIKE FOR THE MEDICATION TO BE SENT TO [facility] # [phone]      PLEASE ADVISE

## 2024-10-10 ENCOUNTER — OFFICE VISIT (OUTPATIENT)
Dept: ONCOLOGY | Facility: CLINIC | Age: 34
End: 2024-10-10
Payer: COMMERCIAL

## 2024-10-10 ENCOUNTER — LAB (OUTPATIENT)
Dept: LAB | Facility: HOSPITAL | Age: 34
End: 2024-10-10
Payer: COMMERCIAL

## 2024-10-10 VITALS
SYSTOLIC BLOOD PRESSURE: 117 MMHG | OXYGEN SATURATION: 96 % | BODY MASS INDEX: 35.16 KG/M2 | DIASTOLIC BLOOD PRESSURE: 79 MMHG | TEMPERATURE: 97.8 F | HEIGHT: 67 IN | HEART RATE: 69 BPM | WEIGHT: 224 LBS | RESPIRATION RATE: 18 BRPM

## 2024-10-10 DIAGNOSIS — I82.90 DEEP VEIN THROMBOSIS (DVT) OF NON-EXTREMITY VEIN, UNSPECIFIED CHRONICITY: Primary | ICD-10-CM

## 2024-10-10 LAB
ALBUMIN SERPL-MCNC: 4.4 G/DL (ref 3.5–5.2)
ALBUMIN/GLOB SERPL: 1.8 G/DL
ALP SERPL-CCNC: 60 U/L (ref 39–117)
ALT SERPL W P-5'-P-CCNC: 14 U/L (ref 1–33)
ANION GAP SERPL CALCULATED.3IONS-SCNC: 8 MMOL/L (ref 5–15)
AST SERPL-CCNC: 16 U/L (ref 1–32)
BASOPHILS # BLD AUTO: 0.01 10*3/MM3 (ref 0–0.2)
BASOPHILS NFR BLD AUTO: 0.1 % (ref 0–1.5)
BILIRUB SERPL-MCNC: 0.5 MG/DL (ref 0–1.2)
BUN SERPL-MCNC: 15 MG/DL (ref 6–20)
BUN/CREAT SERPL: 16.5 (ref 7–25)
CALCIUM SPEC-SCNC: 9.1 MG/DL (ref 8.6–10.5)
CHLORIDE SERPL-SCNC: 106 MMOL/L (ref 98–107)
CO2 SERPL-SCNC: 26 MMOL/L (ref 22–29)
CREAT SERPL-MCNC: 0.91 MG/DL (ref 0.57–1)
DEPRECATED RDW RBC AUTO: 41.8 FL (ref 37–54)
EGFRCR SERPLBLD CKD-EPI 2021: 85.6 ML/MIN/1.73
EOSINOPHIL # BLD AUTO: 0.09 10*3/MM3 (ref 0–0.4)
EOSINOPHIL NFR BLD AUTO: 1.3 % (ref 0.3–6.2)
ERYTHROCYTE [DISTWIDTH] IN BLOOD BY AUTOMATED COUNT: 13.3 % (ref 12.3–15.4)
GLOBULIN UR ELPH-MCNC: 2.5 GM/DL
GLUCOSE SERPL-MCNC: 124 MG/DL (ref 65–99)
HCT VFR BLD AUTO: 39.9 % (ref 34–46.6)
HGB BLD-MCNC: 13.1 G/DL (ref 12–15.9)
HOLD SPECIMEN: NORMAL
LYMPHOCYTES # BLD AUTO: 2.15 10*3/MM3 (ref 0.7–3.1)
LYMPHOCYTES NFR BLD AUTO: 30.2 % (ref 19.6–45.3)
MCH RBC QN AUTO: 29.2 PG (ref 26.6–33)
MCHC RBC AUTO-ENTMCNC: 32.8 G/DL (ref 31.5–35.7)
MCV RBC AUTO: 89.1 FL (ref 79–97)
MONOCYTES # BLD AUTO: 0.35 10*3/MM3 (ref 0.1–0.9)
MONOCYTES NFR BLD AUTO: 4.9 % (ref 5–12)
NEUTROPHILS NFR BLD AUTO: 4.52 10*3/MM3 (ref 1.7–7)
NEUTROPHILS NFR BLD AUTO: 63.5 % (ref 42.7–76)
PLATELET # BLD AUTO: 205 10*3/MM3 (ref 140–450)
PMV BLD AUTO: 11.1 FL (ref 6–12)
POTASSIUM SERPL-SCNC: 4.3 MMOL/L (ref 3.5–5.2)
PROT SERPL-MCNC: 6.9 G/DL (ref 6–8.5)
RBC # BLD AUTO: 4.48 10*6/MM3 (ref 3.77–5.28)
SODIUM SERPL-SCNC: 140 MMOL/L (ref 136–145)
WBC NRBC COR # BLD AUTO: 7.12 10*3/MM3 (ref 3.4–10.8)
WHOLE BLOOD HOLD COAG: NORMAL

## 2024-10-10 PROCEDURE — 85025 COMPLETE CBC W/AUTO DIFF WBC: CPT

## 2024-10-10 PROCEDURE — 80053 COMPREHEN METABOLIC PANEL: CPT | Performed by: INTERNAL MEDICINE

## 2024-10-10 PROCEDURE — 36415 COLL VENOUS BLD VENIPUNCTURE: CPT

## 2024-10-10 NOTE — PROGRESS NOTES
Hematology/Oncology Outpatient Follow Up    PATIENT NAME:Nathalie Panda  :1990  MRN: [id number]  PRIMARY CARE PHYSICIAN: Suhail Issa MD  REFERRING PHYSICIAN: No ref. provider found    Chief Complaint   Patient presents with    Follow-up     Deep vein thrombosis (DVT) of non-extremity vein, unspecified chronicity            HISTORY OF PRESENT ILLNESS:     This is a 33-year-old female who has a history of heterozygote mutation for prothrombin gene as well as heterozygote mutation for factor V Leiden.  In  patient developed right lower extremity deep venous thromboses following surgery.  Reviewed  Review of her labs show that she did have factor V heterozygote mutation identified on her thrombophilia work-up.  She was also found to have a heterozygote mutation for prothrombin gene as well and a positive lupus anticoagulants.  She had anticardiolipin antibodies which were negative Antithrombin III activity was normal and protein C activity was normal at 92%.          She is currently pregnant at 17 weeks gestation.  She is established with maternal-fetal medicine.  She is G2, P1.  With her first pregnancy, patient received prophylactic Lovenox 40 mg twice a day.  She is established with [facility] in [place] and has been recommended to receive Lovenox twice a day which she is actively receiving.     She had preeclampsia with her first pregnancy.  So far this pregnancy has been uneventful.  She denies any bleeding issues    Patient is here for fu. She denies any new issues  Past Medical History:   Diagnosis Date    Pregnant        Past Surgical History:   Procedure Laterality Date    ANKLE SURGERY      ANKLE SURGERY           Current Outpatient Medications:     SPIRONOLACTONE 100MG/5ML SUSP, , Disp: , Rfl:     apixaban (ELIQUIS) 2.5 MG tablet tablet, Take 1 tablet by mouth 2 (Two) Times a Day., Disp: 60 tablet, Rfl: 3    valACYclovir (VALTREX) 500 MG tablet, Take 1 tablet by mouth Daily., Disp: ,  Rfl:     Allergies   Allergen Reactions    Aspirin Unknown - Low Severity     Reports she is not to take these due to her h/o Factor II and Factor V    Nsaids Unknown - Low Severity     Reports she is not to take these due to her h/o Factor II and Factor V       Family History   Problem Relation Age of Onset    Skin cancer Maternal Aunt     Skin cancer Maternal Uncle        Cancer-related family history includes Skin cancer in her maternal aunt and maternal uncle.    Social History     Tobacco Use    Smoking status: Never    Smokeless tobacco: Never   Vaping Use    Vaping status: Never Used   Substance Use Topics    Alcohol use: Never    Drug use: Never       I have reviewed and confirmed the accuracy of the patient's history: Chief complaint, HPI, ROS, and Subjective as entered by the MA/LPN/RN. Tiff De Dios MD 10/10/24  10/10/24      SUBJECTIVE:     She has successfully delivered in September 2023.  Patient is now on Eliquis 2.5 mg twice a day  Patient is still nursing .      Since 2020 when she had a clot she has been on anticoagulation therapy      Denies any new issues    REVIEW OF SYSTEMS:  Review of Systems   Constitutional:  Negative for chills, fatigue and fever.   HENT:  Negative for congestion, drooling, ear discharge, rhinorrhea, sinus pressure and tinnitus.    Eyes:  Negative for photophobia, pain and discharge.   Respiratory:  Negative for apnea, choking and stridor.    Cardiovascular:  Negative for palpitations.   Gastrointestinal:  Negative for abdominal distention, abdominal pain and anal bleeding.   Endocrine: Negative for polydipsia and polyphagia.   Genitourinary:  Negative for decreased urine volume, flank pain and genital sores.   Musculoskeletal:  Negative for gait problem, neck pain and neck stiffness.   Skin:  Negative for color change, rash and wound.   Neurological:  Negative for tremors, seizures, syncope, facial asymmetry and speech difficulty.   Hematological:  Negative for  "adenopathy.   Psychiatric/Behavioral:  Negative for agitation, confusion, hallucinations and self-injury. The patient is not hyperactive.        OBJECTIVE:    Vitals:    10/10/24 1003   BP: 117/79   Pulse: 69   Resp: 18   Temp: 97.8 °F (36.6 °C)   TempSrc: Infrared   SpO2: 96%   Weight: 102 kg (224 lb)   Height: 170.2 cm (67\")   PainSc: 0-No pain         Body mass index is 35.08 kg/m².    ECOG  (0) Fully active, able to carry on all predisease performance without restriction    Physical Exam  Vitals and nursing note reviewed.   Constitutional:       General: She is not in acute distress.     Appearance: She is not diaphoretic.   HENT:      Head: Normocephalic and atraumatic.   Eyes:      General: No scleral icterus.        Right eye: No discharge.         Left eye: No discharge.      Conjunctiva/sclera: Conjunctivae normal.   Neck:      Thyroid: No thyromegaly.   Cardiovascular:      Rate and Rhythm: Normal rate and regular rhythm.      Heart sounds: Normal heart sounds.      No friction rub. No gallop.   Pulmonary:      Effort: Pulmonary effort is normal. No respiratory distress.      Breath sounds: No stridor. No wheezing.   Abdominal:      General: Bowel sounds are normal.      Palpations: Abdomen is soft. There is no mass.      Tenderness: There is no abdominal tenderness. There is no guarding or rebound.   Musculoskeletal:         General: No tenderness. Normal range of motion.      Cervical back: Normal range of motion and neck supple.   Lymphadenopathy:      Cervical: No cervical adenopathy.   Skin:     General: Skin is warm.      Findings: No erythema or rash.   Neurological:      Mental Status: She is alert and oriented to person, place, and time.      Motor: No abnormal muscle tone.   Psychiatric:         Behavior: Behavior normal.       I have reexamined the patient and the results are consistent with the previously documented exam. Tiff De Dios MD            RECENT LABS    WBC   Date Value Ref " "Range Status   10/10/2024 7.12 3.40 - 10.80 10*3/mm3 Final     RBC   Date Value Ref Range Status   10/10/2024 4.48 3.77 - 5.28 10*6/mm3 Final     Hemoglobin   Date Value Ref Range Status   10/10/2024 13.1 12.0 - 15.9 g/dL Final     Hematocrit   Date Value Ref Range Status   10/10/2024 39.9 34.0 - 46.6 % Final     MCV   Date Value Ref Range Status   10/10/2024 89.1 79.0 - 97.0 fL Final     MCH   Date Value Ref Range Status   10/10/2024 29.2 26.6 - 33.0 pg Final     MCHC   Date Value Ref Range Status   10/10/2024 32.8 31.5 - 35.7 g/dL Final     RDW   Date Value Ref Range Status   10/10/2024 13.3 12.3 - 15.4 % Final     RDW-SD   Date Value Ref Range Status   10/10/2024 41.8 37.0 - 54.0 fl Final     MPV   Date Value Ref Range Status   10/10/2024 11.1 6.0 - 12.0 fL Final     Platelets   Date Value Ref Range Status   10/10/2024 205 140 - 450 10*3/mm3 Final     Neutrophil %   Date Value Ref Range Status   10/10/2024 63.5 42.7 - 76.0 % Final     Lymphocyte %   Date Value Ref Range Status   10/10/2024 30.2 19.6 - 45.3 % Final     Monocyte %   Date Value Ref Range Status   10/10/2024 4.9 (L) 5.0 - 12.0 % Final     Eosinophil %   Date Value Ref Range Status   10/10/2024 1.3 0.3 - 6.2 % Final     Basophil %   Date Value Ref Range Status   10/10/2024 0.1 0.0 - 1.5 % Final     Neutrophils, Absolute   Date Value Ref Range Status   10/10/2024 4.52 1.70 - 7.00 10*3/mm3 Final     Lymphocytes, Absolute   Date Value Ref Range Status   10/10/2024 2.15 0.70 - 3.10 10*3/mm3 Final     Monocytes, Absolute   Date Value Ref Range Status   10/10/2024 0.35 0.10 - 0.90 10*3/mm3 Final     Eosinophils, Absolute   Date Value Ref Range Status   10/10/2024 0.09 0.00 - 0.40 10*3/mm3 Final     Basophils, Absolute   Date Value Ref Range Status   10/10/2024 0.01 0.00 - 0.20 10*3/mm3 Final       No results found for: \"GLUCOSE\", \"BUN\", \"CREATININE\", \"EGFRIFNONA\", \"EGFRIFAFRI\", \"BCR\", \"K\", \"CO2\", \"CALCIUM\", \"PROTENTOTREF\", \"ALBUMIN\", \"LABIL2\", \"BILIRUBIN\", " "\"AST\", \"ALT\"      Assessment & Plan     Deep vein thrombosis (DVT) of non-extremity vein, unspecified chronicity  - CBC & Differential          ASSESSMENT:      Deep vein thrombosis (DVT) of non-extremity vein, unspecified chronicity  - CBC & Differential        Thrombophilia with pregnancy.  Patient delivered September 2023  History of right lower extremity DVT popliteal vein 2020 following right calf surgery.  Patient has been on anticoagulation therapy since then  History of iron deficiency anemia with pregnancy.  CBC stable  She received Lovenox 40 mg every 12 hours with pregnancy.  She is now on Eliquis 2.5 mg twice a day.  Continue the same  Prothrombin gene heterozygous mutation  Factor V leidin heterozygous mutation  Positive lupus anticoagulant.  This will be checked 6 months postdelivery.  This will be rechecked in March 2024 and decisions will be made regarding anticoagulation therapy  Assessment has been reviewed and updated        Plans:     Lupus anticoagulant anticardiolipin antibodies and beta-2 glycoprotein March 2024 negative  Continue Eliquis 2.5 mg twice a day refills given patient is more comfortable continuing on anticoagulation therapy given her increased risk for clots  CBC reviewed  Follow-up in 6 months or earlier as needed.  If stable at the next visit would see on an annual basis  CMP today  Hemoglobin is 13.1 g per DL  All questions answered     Patient verbalized understanding and is in agreement of the above plan.        .       "

## 2025-01-23 NOTE — TELEPHONE ENCOUNTER
Caller: Nathalie Panda    Relationship: Self    Best call back number: [phone]     Requested Prescriptions:   Requested Prescriptions     Pending Prescriptions Disp Refills    apixaban (ELIQUIS) 2.5 MG tablet tablet 60 tablet 3     Sig: Take 1 tablet by mouth 2 (Two) Times a Day.        Pharmacy where request should be sent: [facility]/PHARMACY #[id number] [facility] [address] - [phone]  - [phone] [facility]     Last office visit with prescribing clinician: 10/10/2024   Last telemedicine visit with prescribing clinician: Visit date not found   Next office visit with prescribing clinician: 4/10/2025     Additional details provided by patient: PT ONLY HAS 1 PILL LEFT, THIS NEEDS TO BE A 90 DAY SUPPLY FOR INSURANCE PURPOSES TO THE [facility] IN [place] NOW AND GOING FORWARD. PLEASE CALL PT WHEN THIS IS SENT OVER TODAY.     Does the patient have less than a 3 day supply:  [x] Yes  [] No    Would you like a call back once the refill request has been completed: [x] Yes [] No    If the office needs to give you a call back, can they leave a voicemail: [x] Yes [] No    May Drew Rep   01/23/25 13:26 EST

## 2025-04-10 ENCOUNTER — OFFICE VISIT (OUTPATIENT)
Dept: ONCOLOGY | Facility: CLINIC | Age: 35
End: 2025-04-10
Payer: COMMERCIAL

## 2025-04-10 ENCOUNTER — LAB (OUTPATIENT)
Dept: LAB | Facility: HOSPITAL | Age: 35
End: 2025-04-10
Payer: COMMERCIAL

## 2025-04-10 VITALS
SYSTOLIC BLOOD PRESSURE: 122 MMHG | WEIGHT: 218 LBS | HEART RATE: 80 BPM | DIASTOLIC BLOOD PRESSURE: 77 MMHG | HEIGHT: 67 IN | RESPIRATION RATE: 18 BRPM | BODY MASS INDEX: 34.21 KG/M2 | TEMPERATURE: 97.5 F | OXYGEN SATURATION: 98 %

## 2025-04-10 DIAGNOSIS — I82.90 DEEP VEIN THROMBOSIS (DVT) OF NON-EXTREMITY VEIN, UNSPECIFIED CHRONICITY: Primary | ICD-10-CM

## 2025-04-10 LAB
BASOPHILS # BLD AUTO: 0.02 10*3/MM3 (ref 0–0.2)
BASOPHILS NFR BLD AUTO: 0.3 % (ref 0–1.5)
DEPRECATED RDW RBC AUTO: 41.8 FL (ref 37–54)
EOSINOPHIL # BLD AUTO: 0.09 10*3/MM3 (ref 0–0.4)
EOSINOPHIL NFR BLD AUTO: 1.5 % (ref 0.3–6.2)
ERYTHROCYTE [DISTWIDTH] IN BLOOD BY AUTOMATED COUNT: 13.2 % (ref 12.3–15.4)
HCT VFR BLD AUTO: 41.8 % (ref 34–46.6)
HGB BLD-MCNC: 13.7 G/DL (ref 12–15.9)
HOLD SPECIMEN: NORMAL
HOLD SPECIMEN: NORMAL
LYMPHOCYTES # BLD AUTO: 2.13 10*3/MM3 (ref 0.7–3.1)
LYMPHOCYTES NFR BLD AUTO: 36 % (ref 19.6–45.3)
MCH RBC QN AUTO: 28.7 PG (ref 26.6–33)
MCHC RBC AUTO-ENTMCNC: 32.8 G/DL (ref 31.5–35.7)
MCV RBC AUTO: 87.4 FL (ref 79–97)
MONOCYTES # BLD AUTO: 0.38 10*3/MM3 (ref 0.1–0.9)
MONOCYTES NFR BLD AUTO: 6.4 % (ref 5–12)
NEUTROPHILS NFR BLD AUTO: 3.3 10*3/MM3 (ref 1.7–7)
NEUTROPHILS NFR BLD AUTO: 55.8 % (ref 42.7–76)
PLATELET # BLD AUTO: 220 10*3/MM3 (ref 140–450)
PMV BLD AUTO: 11.5 FL (ref 6–12)
RBC # BLD AUTO: 4.78 10*6/MM3 (ref 3.77–5.28)
WBC NRBC COR # BLD AUTO: 5.92 10*3/MM3 (ref 3.4–10.8)

## 2025-04-10 PROCEDURE — 36415 COLL VENOUS BLD VENIPUNCTURE: CPT

## 2025-04-10 PROCEDURE — 85025 COMPLETE CBC W/AUTO DIFF WBC: CPT

## 2025-04-10 NOTE — PROGRESS NOTES
Hematology/Oncology Outpatient Follow Up    PATIENT NAME:Nathalie Panad  :1990  MRN: [id number]  PRIMARY CARE PHYSICIAN: Suhail Issa MD  REFERRING PHYSICIAN: No ref. provider found    Chief Complaint   Patient presents with    Follow-up     Deep vein thrombosis (DVT) of non-extremity vein, unspecified chronicity            HISTORY OF PRESENT ILLNESS:     This is a 33-year-old female who has a history of heterozygote mutation for prothrombin gene as well as heterozygote mutation for factor V Leiden.  In  patient developed right lower extremity deep venous thromboses following surgery.  Reviewed  Review of her labs show that she did have factor V heterozygote mutation identified on her thrombophilia work-up.  She was also found to have a heterozygote mutation for prothrombin gene as well and a positive lupus anticoagulants.  She had anticardiolipin antibodies which were negative Antithrombin III activity was normal and protein C activity was normal at 92%.          She is currently pregnant at 17 weeks gestation.  She is established with maternal-fetal medicine.  She is G2, P1.  With her first pregnancy, patient received prophylactic Lovenox 40 mg twice a day.  She is established with [facility] in [place] and has been recommended to receive Lovenox twice a day which she is actively receiving.     She had preeclampsia with her first pregnancy.  So far this pregnancy has been uneventful.  She denies any bleeding issues    Patient is here for fu. She denies any new issues  Past Medical History:   Diagnosis Date    Pregnant        Past Surgical History:   Procedure Laterality Date    ANKLE SURGERY      ANKLE SURGERY           Current Outpatient Medications:     apixaban (ELIQUIS) 2.5 MG tablet tablet, Take 1 tablet by mouth 2 (Two) Times a Day., Disp: 180 tablet, Rfl: 3    SPIRONOLACTONE 100MG/5ML SUSP, , Disp: , Rfl:     valACYclovir (VALTREX) 500 MG tablet, Take 1 tablet by mouth Daily., Disp: ,  Rfl:     Allergies   Allergen Reactions    Aspirin Unknown - Low Severity     Reports she is not to take these due to her h/o Factor II and Factor V    Nsaids Unknown - Low Severity     Reports she is not to take these due to her h/o Factor II and Factor V       Family History   Problem Relation Age of Onset    Skin cancer Maternal Aunt     Skin cancer Maternal Uncle        Cancer-related family history includes Skin cancer in her maternal aunt and maternal uncle.    Social History     Tobacco Use    Smoking status: Never    Smokeless tobacco: Never   Vaping Use    Vaping status: Never Used   Substance Use Topics    Alcohol use: Never    Drug use: Never       I have reviewed and confirmed the accuracy of the patient's history: Chief complaint, HPI, ROS, and Subjective as entered by the MA/LPN/RN. Tiff De Dios MD 04/10/25        SUBJECTIVE:     She has successfully delivered in September 2023.  Patient is now on Eliquis 2.5 mg twice a day  Patient is still nursing .      Since 2020 when she had a clot she has been on anticoagulation therapy      Patient denies any new issues    REVIEW OF SYSTEMS:  Review of Systems   Constitutional:  Negative for chills, fatigue and fever.   HENT:  Negative for congestion, drooling, ear discharge, rhinorrhea, sinus pressure and tinnitus.    Eyes:  Negative for photophobia, pain and discharge.   Respiratory:  Negative for apnea, choking and stridor.    Cardiovascular:  Negative for palpitations.   Gastrointestinal:  Negative for abdominal distention, abdominal pain and anal bleeding.   Endocrine: Negative for polydipsia and polyphagia.   Genitourinary:  Negative for decreased urine volume, flank pain and genital sores.   Musculoskeletal:  Negative for gait problem, neck pain and neck stiffness.   Skin:  Negative for color change, rash and wound.   Neurological:  Negative for tremors, seizures, syncope, facial asymmetry and speech difficulty.   Hematological:  Negative for  "adenopathy.   Psychiatric/Behavioral:  Negative for agitation, confusion, hallucinations and self-injury. The patient is not hyperactive.        OBJECTIVE:    Vitals:    04/10/25 1024   BP: 122/77   Pulse: 80   Resp: 18   Temp: 97.5 °F (36.4 °C)   TempSrc: Temporal   SpO2: 98%   Weight: 98.9 kg (218 lb)   Height: 170.2 cm (67\")   PainSc: 0-No pain           Body mass index is 34.14 kg/m².    ECOG  (0) Fully active, able to carry on all predisease performance without restriction    Physical Exam  Vitals and nursing note reviewed.   Constitutional:       General: She is not in acute distress.     Appearance: She is not diaphoretic.   HENT:      Head: Normocephalic and atraumatic.   Eyes:      General: No scleral icterus.        Right eye: No discharge.         Left eye: No discharge.      Conjunctiva/sclera: Conjunctivae normal.   Neck:      Thyroid: No thyromegaly.   Cardiovascular:      Rate and Rhythm: Normal rate and regular rhythm.      Heart sounds: Normal heart sounds.      No friction rub. No gallop.   Pulmonary:      Effort: Pulmonary effort is normal. No respiratory distress.      Breath sounds: No stridor. No wheezing.   Abdominal:      General: Bowel sounds are normal.      Palpations: Abdomen is soft. There is no mass.      Tenderness: There is no abdominal tenderness. There is no guarding or rebound.   Musculoskeletal:         General: No tenderness. Normal range of motion.      Cervical back: Normal range of motion and neck supple.   Lymphadenopathy:      Cervical: No cervical adenopathy.   Skin:     General: Skin is warm.      Findings: No erythema or rash.   Neurological:      Mental Status: She is alert and oriented to person, place, and time.      Motor: No abnormal muscle tone.   Psychiatric:         Behavior: Behavior normal.         I have reexamined the patient and the results are consistent with the previously documented exam. Tiff De Dios MD        RECENT LABS    WBC   Date Value Ref " Range Status   04/10/2025 5.92 3.40 - 10.80 10*3/mm3 Final     RBC   Date Value Ref Range Status   04/10/2025 4.78 3.77 - 5.28 10*6/mm3 Final     Hemoglobin   Date Value Ref Range Status   04/10/2025 13.7 12.0 - 15.9 g/dL Final     Hematocrit   Date Value Ref Range Status   04/10/2025 41.8 34.0 - 46.6 % Final     MCV   Date Value Ref Range Status   04/10/2025 87.4 79.0 - 97.0 fL Final     MCH   Date Value Ref Range Status   04/10/2025 28.7 26.6 - 33.0 pg Final     MCHC   Date Value Ref Range Status   04/10/2025 32.8 31.5 - 35.7 g/dL Final     RDW   Date Value Ref Range Status   04/10/2025 13.2 12.3 - 15.4 % Final     RDW-SD   Date Value Ref Range Status   04/10/2025 41.8 37.0 - 54.0 fl Final     MPV   Date Value Ref Range Status   04/10/2025 11.5 6.0 - 12.0 fL Final     Platelets   Date Value Ref Range Status   04/10/2025 220 140 - 450 10*3/mm3 Final     Neutrophil %   Date Value Ref Range Status   04/10/2025 55.8 42.7 - 76.0 % Final     Lymphocyte %   Date Value Ref Range Status   04/10/2025 36.0 19.6 - 45.3 % Final     Monocyte %   Date Value Ref Range Status   04/10/2025 6.4 5.0 - 12.0 % Final     Eosinophil %   Date Value Ref Range Status   04/10/2025 1.5 0.3 - 6.2 % Final     Basophil %   Date Value Ref Range Status   04/10/2025 0.3 0.0 - 1.5 % Final     Neutrophils, Absolute   Date Value Ref Range Status   04/10/2025 3.30 1.70 - 7.00 10*3/mm3 Final     Lymphocytes, Absolute   Date Value Ref Range Status   04/10/2025 2.13 0.70 - 3.10 10*3/mm3 Final     Monocytes, Absolute   Date Value Ref Range Status   04/10/2025 0.38 0.10 - 0.90 10*3/mm3 Final     Eosinophils, Absolute   Date Value Ref Range Status   04/10/2025 0.09 0.00 - 0.40 10*3/mm3 Final     Basophils, Absolute   Date Value Ref Range Status   04/10/2025 0.02 0.00 - 0.20 10*3/mm3 Final       Lab Results   Component Value Date    GLUCOSE 124 (H) 10/10/2024    BUN 15 10/10/2024    CREATININE 0.91 10/10/2024    BCR 16.5 10/10/2024    K 4.3 10/10/2024    CO2  26.0 10/10/2024    CALCIUM 9.1 10/10/2024    ALBUMIN 4.4 10/10/2024    AST 16 10/10/2024    ALT 14 10/10/2024         Assessment & Plan     Deep vein thrombosis (DVT) of non-extremity vein, unspecified chronicity  - CBC & Differential            ASSESSMENT:      Deep vein thrombosis (DVT) of non-extremity vein, unspecified chronicity  - CBC & Differential        Thrombophilia with pregnancy.  Patient delivered September 2023  History of right lower extremity DVT popliteal vein 2020 following right calf surgery.  Patient has been on anticoagulation therapy since then  History of iron deficiency anemia with pregnancy.  CBC stable  She received Lovenox 40 mg every 12 hours with pregnancy.  She is now on Eliquis 2.5 mg twice a day.  Continue the same  Prothrombin gene heterozygous mutation  Factor V leidin heterozygous mutation  Positive lupus anticoagulant.  This will be checked 6 months postdelivery.  This will be rechecked in March 2024 and decisions will be made regarding anticoagulation therapy  Assessment has been reviewed and updated        Plans:     As above  Continue Eliquis twice a day  Lupus anticoagulant anticardiolipin antibodies and beta-2 glycoprotein March 2024 negative  Follow-up 1 year  CBC reviewed.  Hemoglobin is 13.7 g per DL  All questions answered     Patient verbalized understanding and is in agreement of the above plan.        . Electronically signed by Tiff De Dios MD, 04/10/25, 4:52 PM EDT.

## 2025-04-22 ENCOUNTER — TELEPHONE (OUTPATIENT)
Dept: ONCOLOGY | Facility: CLINIC | Age: 35
End: 2025-04-22
Payer: COMMERCIAL

## 2025-04-22 NOTE — TELEPHONE ENCOUNTER
Caller: Nathalie Panda    Relationship: Self    Best call back number:   Telephone Information:   Mobile [phone]     What was the call regarding: PLEASE SEND IN A NEW SCRIPT FOR PATIENT'S   ELIQUIS 2.5 MG TABLET, TWICE A DAY TO [facility], [address] , NEED REFILLS FOR 12 MONTHS, SHE DOES NOT COME BACK TILL 4/2026.    Is it okay if the provider responds through MyChart: YES

## 2025-04-22 NOTE — TELEPHONE ENCOUNTER
Caller: Nathalie Panda    Relationship: Self    Best call back number: [phone]    What is the best time to reach you: ANYTIME    Who are you requesting to speak with (clinical staff, provider,  specific staff member): CLINICAL    What was the call regarding: PT REQUESTING HER PRESCRIPTION FOR ELIQUIS RESENT OVER TO   [id number] - [place], [place] - [address] - [phone]  - [phone] FX

## 2025-08-15 ENCOUNTER — TELEPHONE (OUTPATIENT)
Dept: ONCOLOGY | Facility: CLINIC | Age: 35
End: 2025-08-15
Payer: COMMERCIAL

## 2025-08-15 DIAGNOSIS — D68.2 FACTOR II DEFICIENCY: Primary | ICD-10-CM
